# Patient Record
Sex: FEMALE | Race: WHITE | Employment: FULL TIME | URBAN - METROPOLITAN AREA
[De-identification: names, ages, dates, MRNs, and addresses within clinical notes are randomized per-mention and may not be internally consistent; named-entity substitution may affect disease eponyms.]

---

## 2018-09-02 ENCOUNTER — HOSPITAL ENCOUNTER (EMERGENCY)
Age: 58
Discharge: HOME | End: 2018-09-02
Payer: COMMERCIAL

## 2018-09-02 VITALS
SYSTOLIC BLOOD PRESSURE: 192 MMHG | TEMPERATURE: 97.6 F | WEIGHT: 160 LBS | DIASTOLIC BLOOD PRESSURE: 97 MMHG | RESPIRATION RATE: 16 BRPM | HEART RATE: 93 BPM | OXYGEN SATURATION: 100 %

## 2018-09-02 DIAGNOSIS — H60.502 ACUTE OTITIS EXTERNA OF LEFT EAR, UNSPECIFIED TYPE: ICD-10-CM

## 2018-09-02 DIAGNOSIS — H61.22 IMPACTED CERUMEN OF LEFT EAR: Primary | ICD-10-CM

## 2018-09-02 PROCEDURE — 6370000000 HC RX 637 (ALT 250 FOR IP): Performed by: NURSE PRACTITIONER

## 2018-09-02 PROCEDURE — 99212 OFFICE O/P EST SF 10 MIN: CPT

## 2018-09-02 PROCEDURE — 69210 REMOVE IMPACTED EAR WAX UNI: CPT

## 2018-09-02 RX ORDER — IBUPROFEN 400 MG/1
800 TABLET ORAL ONCE
Status: COMPLETED | OUTPATIENT
Start: 2018-09-02 | End: 2018-09-02

## 2018-09-02 RX ORDER — IBUPROFEN 800 MG/1
800 TABLET ORAL EVERY 8 HOURS PRN
Qty: 21 TABLET | Refills: 0 | Status: SHIPPED | OUTPATIENT
Start: 2018-09-02 | End: 2022-03-20

## 2018-09-02 RX ORDER — CIPROFLOXACIN AND DEXAMETHASONE 3; 1 MG/ML; MG/ML
4 SUSPENSION/ DROPS AURICULAR (OTIC) 2 TIMES DAILY
Qty: 1 BOTTLE | Refills: 0 | Status: SHIPPED | OUTPATIENT
Start: 2018-09-02 | End: 2018-09-09

## 2018-09-02 RX ADMIN — IBUPROFEN 800 MG: 400 TABLET ORAL at 09:54

## 2018-09-02 ASSESSMENT — PAIN SCALES - GENERAL
PAINLEVEL_OUTOF10: 7
PAINLEVEL_OUTOF10: 7

## 2018-09-02 ASSESSMENT — PAIN DESCRIPTION - DESCRIPTORS: DESCRIPTORS: ACHING

## 2018-09-02 ASSESSMENT — PAIN DESCRIPTION - ORIENTATION: ORIENTATION: LEFT

## 2018-09-02 ASSESSMENT — PAIN DESCRIPTION - LOCATION: LOCATION: EAR

## 2018-09-02 ASSESSMENT — PAIN DESCRIPTION - PAIN TYPE: TYPE: ACUTE PAIN

## 2018-09-02 NOTE — ED PROVIDER NOTES
Normal      ---------------------------------------------------PHYSICAL EXAM--------------------------------------      Constitutional/General: Alert and oriented x3, well appearing, non toxic in NAD  Head: Normocephalic and atraumatic  Eyes: conjunctiva clear  Ears: Right ear canal totally clear normal TM no signs of infection, left ear canal is occluded with cerumen  Mouth: Oropharynx clear, handling secretions, no trismus  Neck: Supple, full ROM,   Pulmonary: Lungs clear to auscultation bilaterally, no wheezes, rales, or rhonchi. Not in respiratory distress  Cardiovascular:  Regular rate and rhythm, no murmurs, gallops, or rubs. 2+ distal pulses  Abdomen: Soft, non tender, non distended,   Extremities: Moves all extremities x 4. Warm and well perfused  Skin: warm and dry without rash  Neurologic: GCS 15,  Psych: Normal Affect      ------------------------------ ED COURSE/MEDICAL DECISION MAKING----------------------  Medications   ibuprofen (ADVIL;MOTRIN) tablet 800 mg (not administered)         ED COURSE:       Medical Decision Making:    Ear irrigation was performed by the LPN with return of cerumen I did reexamine her ear. Ear canal is clear but she still has cerumen up against the TM. Ear canal appears erythematous. I did attempt to flush it again but was not able to remove any further of the cerumen. States her ear is a little bit painful I did give her some ibuprofen now. I did put her on some ciprodex otic drops. For the otitis externa I referred her to ENT for follow-up. She should also follow up with her doctor. Counseling: The emergency provider has spoken with the patient and discussed todays results, in addition to providing specific details for the plan of care and counseling regarding the diagnosis and prognosis.   Questions are answered at this time and they are agreeable with the plan.      --------------------------------- IMPRESSION AND DISPOSITION ---------------------------------    IMPRESSION  1. Impacted cerumen of left ear    2. Acute otitis externa of left ear, unspecified type        DISPOSITION  Disposition: Discharge to home  Patient condition is good      NOTE: This report was transcribed using voice recognition software.  Every effort was made to ensure accuracy; however, inadvertent computerized transcription errors may be present     BARB Snyder - CARLOS ENRIQUE  09/02/18 1940

## 2018-09-06 ENCOUNTER — OFFICE VISIT (OUTPATIENT)
Dept: ENT CLINIC | Age: 58
End: 2018-09-06
Payer: COMMERCIAL

## 2018-09-06 VITALS
SYSTOLIC BLOOD PRESSURE: 164 MMHG | HEIGHT: 63 IN | BODY MASS INDEX: 29.41 KG/M2 | HEART RATE: 90 BPM | WEIGHT: 166 LBS | DIASTOLIC BLOOD PRESSURE: 90 MMHG

## 2018-09-06 DIAGNOSIS — H61.22 LEFT EAR IMPACTED CERUMEN: Primary | ICD-10-CM

## 2018-09-06 DIAGNOSIS — H61.22 HEARING LOSS OF LEFT EAR DUE TO CERUMEN IMPACTION: ICD-10-CM

## 2018-09-06 DIAGNOSIS — H60.332 ACUTE SWIMMER'S EAR OF LEFT SIDE: ICD-10-CM

## 2018-09-06 PROCEDURE — 92504 EAR MICROSCOPY EXAMINATION: CPT | Performed by: PHYSICIAN ASSISTANT

## 2018-09-06 PROCEDURE — 99203 OFFICE O/P NEW LOW 30 MIN: CPT | Performed by: PHYSICIAN ASSISTANT

## 2018-09-06 ASSESSMENT — ENCOUNTER SYMPTOMS
RESPIRATORY NEGATIVE: 1
NAUSEA: 0
EYES NEGATIVE: 1
VOMITING: 0
GASTROINTESTINAL NEGATIVE: 1
COUGH: 0
SHORTNESS OF BREATH: 0

## 2018-09-06 NOTE — PROGRESS NOTES
[facility] Otolaryngology      Patient Name:  Nasima Rogers  :  1960     CHIEF C/O:    Chief Complaint   Patient presents with    Ear Problem     left ear plugged was seen at urgent care       HISTORY OBTAINED FROM:  patient    HISTORY OF PRESENT ILLNESS:       William Sanabria is a 60 y.o. female, here today for left ear plug. Patient states for the last couple months she started to have left ear hearing loss thought that she may have swimmers ear but tried to ignore it. Then on  patient went to urgent care who attempted to flush the patients ear small amounts of cerumen were removed. Patient states after that she had 2 days of left-sided ear pain that has since improved. Patient was given antibiotic drops ciprodex which she used once last night. Dizziness was resolved with exercises no further episodes recently. The patient did attempt to use Debrox to loosen wax prior to urgent care. None use recently. Patient notes complete left-sided hearing loss. Tinnitus: no  Family history of ENT disorders/ hearing: family history of hearing loss as patient has older age.      Past Medical History:   Diagnosis Date    Basal cell carcinoma of nose     Benign positional vertigo 2012    Fibromyalgia     Hypertension      Past Surgical History:   Procedure Laterality Date    SINUS SURGERY      repair hole from wisdom tooth removal    SKIN CANCER EXCISION      basal cell carcinoma/ rt side of nose       Current Outpatient Prescriptions:     ciprofloxacin-dexamethasone (CIPRODEX) 0.3-0.1 % otic suspension, Place 4 drops into the left ear 2 times daily for 7 days, Disp: 1 Bottle, Rfl: 0    ibuprofen (IBU) 800 MG tablet, Take 1 tablet by mouth every 8 hours as needed for Pain, Disp: 21 tablet, Rfl: 0  Penicillins and Amoxicillin-pot clavulanate  Social History   Substance Use Topics    Smoking status: Never Smoker    Smokeless tobacco: Never Used    Alcohol use No     Family History   Problem Relation Age removal was discontinued due to depth and consistency of cerumen and patient was advised to use ciprodex. Johanna present      DIAGNOSIS:    ICD-10-CM ICD-9-CM    1. Left ear impacted cerumen H61.22 380.4 [place] EAR MICROSCOPY EXAMINATION   2. Hearing loss of left ear due to cerumen impaction H61.22 389.8 [place] EAR MICROSCOPY EXAMINATION     380.4    3. Acute swimmer's ear of left side H60.332 380.12 [place] EAR MICROSCOPY EXAMINATION     IMPRESSION/PLAN:  use ciprodex 4 drops twice a day for 5 days to treat mild otitis externa that was present. Then allow the ear to completely dry for 1 day and start daily debrox soaks until your next visit. Follow up in 10 days for completion of cerumen removal of the left ear. The plan was explained and patient is without any further questions. Follow up in 10 day(s), or if any new or worsening symptoms call the office to be seen sooner or report to the emergency department. This note was done using voice recognition software. There may be accidental errors in grammar or spelling.    Charmayne Lemming, PA

## 2018-09-17 ENCOUNTER — OFFICE VISIT (OUTPATIENT)
Dept: ENT CLINIC | Age: 58
End: 2018-09-17
Payer: COMMERCIAL

## 2018-09-17 VITALS
HEIGHT: 62 IN | WEIGHT: 166.2 LBS | BODY MASS INDEX: 30.59 KG/M2 | HEART RATE: 95 BPM | SYSTOLIC BLOOD PRESSURE: 171 MMHG | DIASTOLIC BLOOD PRESSURE: 91 MMHG

## 2018-09-17 DIAGNOSIS — H61.21 HEARING LOSS OF RIGHT EAR DUE TO CERUMEN IMPACTION: Primary | ICD-10-CM

## 2018-09-17 PROCEDURE — 69210 REMOVE IMPACTED EAR WAX UNI: CPT | Performed by: OTOLARYNGOLOGY

## 2018-10-07 ASSESSMENT — ENCOUNTER SYMPTOMS
BLURRED VISION: 0
COUGH: 0
DOUBLE VISION: 0
HEARTBURN: 0
SHORTNESS OF BREATH: 0
VOMITING: 0

## 2019-06-19 ENCOUNTER — OFFICE VISIT (OUTPATIENT)
Dept: ENT CLINIC | Age: 59
End: 2019-06-19
Payer: COMMERCIAL

## 2019-06-19 VITALS
WEIGHT: 159 LBS | BODY MASS INDEX: 28.17 KG/M2 | DIASTOLIC BLOOD PRESSURE: 92 MMHG | SYSTOLIC BLOOD PRESSURE: 142 MMHG | HEART RATE: 84 BPM | HEIGHT: 63 IN

## 2019-06-19 DIAGNOSIS — H61.23 HEARING LOSS DUE TO CERUMEN IMPACTION, BILATERAL: Primary | ICD-10-CM

## 2019-06-19 PROCEDURE — 69210 REMOVE IMPACTED EAR WAX UNI: CPT | Performed by: OTOLARYNGOLOGY

## 2019-07-05 ASSESSMENT — ENCOUNTER SYMPTOMS
SHORTNESS OF BREATH: 0
VOMITING: 0
COUGH: 0
FACIAL SWELLING: 0

## 2019-07-11 ENCOUNTER — HOSPITAL ENCOUNTER (EMERGENCY)
Age: 59
Discharge: HOME | End: 2019-07-11
Payer: COMMERCIAL

## 2019-07-11 VITALS
TEMPERATURE: 98.2 F | BODY MASS INDEX: 28.8 KG/M2 | OXYGEN SATURATION: 99 % | RESPIRATION RATE: 16 BRPM | WEIGHT: 160 LBS | HEART RATE: 96 BPM | DIASTOLIC BLOOD PRESSURE: 100 MMHG | SYSTOLIC BLOOD PRESSURE: 198 MMHG

## 2019-07-11 DIAGNOSIS — L03.115 CELLULITIS OF RIGHT LOWER EXTREMITY: Primary | ICD-10-CM

## 2019-07-11 PROCEDURE — 99212 OFFICE O/P EST SF 10 MIN: CPT

## 2019-07-11 RX ORDER — DOXYCYCLINE HYCLATE 100 MG
100 TABLET ORAL 2 TIMES DAILY
Qty: 20 TABLET | Refills: 0 | Status: SHIPPED | OUTPATIENT
Start: 2019-07-11 | End: 2019-07-21

## 2022-03-20 ENCOUNTER — HOSPITAL ENCOUNTER (EMERGENCY)
Age: 62
Discharge: HOME | End: 2022-03-20
Payer: COMMERCIAL

## 2022-03-20 ENCOUNTER — APPOINTMENT (OUTPATIENT)
Dept: GENERAL RADIOLOGY | Age: 62
End: 2022-03-20
Payer: COMMERCIAL

## 2022-03-20 VITALS
SYSTOLIC BLOOD PRESSURE: 132 MMHG | WEIGHT: 170 LBS | DIASTOLIC BLOOD PRESSURE: 96 MMHG | RESPIRATION RATE: 18 BRPM | HEIGHT: 62 IN | OXYGEN SATURATION: 100 % | TEMPERATURE: 97 F | HEART RATE: 103 BPM | BODY MASS INDEX: 31.28 KG/M2

## 2022-03-20 DIAGNOSIS — M25.561 ACUTE PAIN OF RIGHT KNEE: ICD-10-CM

## 2022-03-20 DIAGNOSIS — S80.01XA CONTUSION OF RIGHT KNEE, INITIAL ENCOUNTER: ICD-10-CM
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
## 2022-03-20 DIAGNOSIS — I10 ESSENTIAL HYPERTENSION: ICD-10-CM

## 2022-03-20 DIAGNOSIS — M25.511 ACUTE PAIN OF RIGHT SHOULDER: ICD-10-CM

## 2022-03-20 DIAGNOSIS — M25.531 RIGHT WRIST PAIN: ICD-10-CM

## 2022-03-20 PROCEDURE — 73110 X-RAY EXAM OF WRIST: CPT

## 2022-03-20 PROCEDURE — 73060 X-RAY EXAM OF HUMERUS: CPT

## 2022-03-20 PROCEDURE — 6370000000 HC RX 637 (ALT 250 FOR IP): Performed by: PHYSICIAN ASSISTANT

## 2022-03-20 PROCEDURE — 73030 X-RAY EXAM OF SHOULDER: CPT

## 2022-03-20 PROCEDURE — 73562 X-RAY EXAM OF KNEE 3: CPT

## 2022-03-20 PROCEDURE — 99284 EMERGENCY DEPT VISIT MOD MDM: CPT

## 2022-03-20 RX ORDER — ACETAMINOPHEN 500 MG
1000 TABLET ORAL ONCE
Status: COMPLETED | OUTPATIENT
Start: 2022-03-20 | End: 2022-03-20

## 2022-03-20 RX ORDER — IBUPROFEN AND FAMOTIDINE 26.6; 8 MG/1; MG/1
1 TABLET, FILM COATED ORAL EVERY 8 HOURS PRN
Qty: 30 TABLET | Refills: 0 | Status: SHIPPED | OUTPATIENT
Start: 2022-03-20

## 2022-03-20 RX ADMIN — ACETAMINOPHEN 1000 MG: 500 TABLET ORAL at 01:15

## 2022-03-20 ASSESSMENT — PAIN DESCRIPTION - FREQUENCY: FREQUENCY: CONTINUOUS

## 2022-03-20 ASSESSMENT — PAIN DESCRIPTION - DESCRIPTORS: DESCRIPTORS: ACHING;SHARP;SHOOTING

## 2022-03-20 ASSESSMENT — PAIN SCALES - GENERAL
PAINLEVEL_OUTOF10: 10
PAINLEVEL_OUTOF10: 7

## 2022-03-20 ASSESSMENT — PAIN DESCRIPTION - LOCATION: LOCATION: ARM;SHOULDER;KNEE

## 2022-03-20 ASSESSMENT — PAIN - FUNCTIONAL ASSESSMENT: PAIN_FUNCTIONAL_ASSESSMENT: 0-10

## 2022-03-20 ASSESSMENT — PAIN DESCRIPTION - ORIENTATION: ORIENTATION: RIGHT

## 2022-03-20 NOTE — ED PROVIDER NOTES
[facility]     Department of Emergency Medicine   ED  Provider Note  Admit Date/RoomTime: 3/20/2022 12:15 AM  ED Room: 26/26    Chief Complaint   Arm Injury (right arm ( Patient fell at work )) and Knee Injury (right arm)    History of Present Illness      Magrarita Babcock is a 57 y.o. old female who presents to the ED for fall. 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 She states she was taking a patient to Xanax and water when some of the water came out of the cup and she slipped and fell. Patient states she did try to brace herself and is having pain to her right shoulder and right knee. She denies hitting her head or having any loss of consciousness. Patient does not take anticoagulation. She is denying any neck pain, headache, dizziness, vision changes, nausea, vomiting, chest pain, shortness breath, pain with breathing, abdominal pain, numbness/tingling, sensation changes, back pain, or difficulty with ambulation or balance. Patient states she does have pain to her knee with movement but she is still able to ambulate independently. Patient is alert and oriented x3 and in no apparent distress at this exam.  She is nontoxic-appearing. This is a workers comp case. ROS   Pertinent positives and negatives are stated within HPI, all other systems reviewed and are negative. Past Medical History:  has a past medical history of Basal cell carcinoma of nose, Benign positional vertigo, Fibromyalgia, and Hypertension. Past Surgical History:  has a past surgical history that includes sinus surgery (2003) and Skin cancer excision (2001). Social History:  reports that she has never smoked. She has never used smokeless tobacco. She reports that she does not drink alcohol and does not use drugs. Family History: family history includes High Blood Pressure in her father and mother. The patients home medications have been reviewed.     Allergies: Penicillins and Amoxicillin-pot clavulanate  Allergies have been reviewed with patient. Physical Exam   VS:  BP (!) 132/96   Pulse 103   Temp 97 °F (36.1 °C)   Resp 18   Ht 5' 2\" (1.575 m)   Wt 170 lb (77.1 kg)   SpO2 100%   BMI 31.09 kg/m²     Oxygen Saturation Interpretation: Normal.    * Patient reports history of HTN but is not on any medications  She has no cp, sob, headache, vision changes, or dizziness     Constitutional:  Alert and oriented x4, development consistent with age, NAD  HEENT:  NC/NT. No bruising or lacerations, No blood noted in nares or ears, EOMI, PERRLA, Airway patent. Neck:  No midline cervical tenderness. No crepitus, mild right trapezius tenderness     Chest:  Symmetrical without visible rash or tenderness. No bruising   Respiratory:  Clear and equal bilaterally with good airflow, No respiratory distress    CV:  Regular rate and rhythm at this exam, pulse 84  Back:  No midline or paravertebral tenderness. No step-offs. No costovertebral tenderness. Extremities: Tenderness to right AC and proximal humerus, no TTP to elbow, mild tenderness to wrist, full ROM all fingers with intact sensations distally, no tenderness to left UE, mild tenderness to right knee, no pain to feet, ankles, or hips   Integument:  Normal turgor. Warm, dry, without visible rash, unless noted elsewhere. Neurological:  GCS 15, CN II-XII intact, Motor functions intact. Lab / Imaging Results   (All laboratory and radiology results have been personally reviewed by myself)  Labs:  No results found for this visit on 03/20/22. Imaging: All Radiology results interpreted by Radiologist unless otherwise noted. XR SHOULDER RIGHT (MIN 2 VIEWS)   Final Result   No acute findings. XR HUMERUS RIGHT (MIN 2 VIEWS)   Final Result   No acute findings. XR WRIST RIGHT (MIN 3 VIEWS)   Final Result   No acute findings. XR KNEE RIGHT (3 VIEWS)   Final Result   No acute findings.            ED Course / Medical Decision Making Medications   acetaminophen (TYLENOL) tablet 1,000 mg (1,000 mg Oral Given 3/20/22 0115)     Re-examination:   Patients symptoms are improving. Consult(s):  None    Procedure(s):  none    MDM:       Counseling: The emergency provider has spoken with the patient/caregiver and discussed todays results, in addition to providing specific details for the plan of care and counseling regarding the diagnosis and prognosis. Questions are answered at this time and they are agreeable with the plan. All results reviewed with pt and all questions answered. Patient understands that she must follow-up with PCP for evaluation of hypertension and occupational health for follow-up on work injury. Patient was advised to return to ED if symptoms worsen or new symptoms develop. Pt remained nontoxic, afebrile, and A&O x4 during this ED visit. They agreed with plan of care, discharge, and importance of follow-up. Pt was in no distress at discharge. Vitals stable. They were educated on newly prescribed medications. She was able to ambulate out of department with no difficulty. Patient will be given work note to return to work on the 22nd. Assessment      1. Fall on same level from slipping, tripping or stumbling, initial encounter    2. Acute pain of right shoulder    3. Right wrist pain    4. Acute pain of right knee    5. Contusion of right knee, initial encounter    6. Essential hypertension      Plan   Discharge to home   Patient condition is good    New Medications     Discharge Medication List as of 3/20/2022  1:38 AM        Electronically signed by Karl Esquivel PA-C   DD: 3/20/22    **This report was transcribed using voice recognition software. Every effort was made to ensure accuracy; however, inadvertent computerized transcription errors may be present.     END OF ED PROVIDER NOTE         Karl Esquivel PA-C  03/20/22 [id number]

## 2022-03-20 NOTE — Clinical Note
Emilie Barnett was seen and treated in our emergency department on 3/20/2022. She may return to work on 03/21/2022. If you have any questions or concerns, please don't hesitate to call.       Frank Patiño PA-C

## 2022-04-11 ENCOUNTER — HOSPITAL ENCOUNTER (OUTPATIENT)
Dept: MRI IMAGING | Age: 62
Discharge: HOME | End: 2022-04-13
Payer: COMMERCIAL

## 2022-04-11 DIAGNOSIS — S46.811A TRAUMATIC TEAR OF SUPRASPINATUS TENDON OF RIGHT SHOULDER, INITIAL ENCOUNTER: ICD-10-CM

## 2022-04-11 PROCEDURE — 73221 MRI JOINT UPR EXTREM W/O DYE: CPT

## 2022-09-24 ENCOUNTER — HOSPITAL ENCOUNTER (EMERGENCY)
Age: 62
Discharge: HOME | End: 2022-09-24
Attending: EMERGENCY MEDICINE
Payer: COMMERCIAL

## 2022-09-24 ENCOUNTER — HOSPITAL ENCOUNTER (OUTPATIENT)
Dept: ULTRASOUND IMAGING | Age: 62
Discharge: HOME | End: 2022-09-24
Payer: COMMERCIAL

## 2022-09-24 ENCOUNTER — APPOINTMENT (OUTPATIENT)
Dept: GENERAL RADIOLOGY | Age: 62
End: 2022-09-24
Payer: COMMERCIAL

## 2022-09-24 VITALS
DIASTOLIC BLOOD PRESSURE: 90 MMHG | HEART RATE: 91 BPM | BODY MASS INDEX: 32.02 KG/M2 | OXYGEN SATURATION: 99 % | HEIGHT: 62 IN | SYSTOLIC BLOOD PRESSURE: 158 MMHG | TEMPERATURE: 98.1 F | WEIGHT: 174 LBS | RESPIRATION RATE: 18 BRPM

## 2022-09-24 DIAGNOSIS — R60.0 LEG EDEMA, RIGHT: ICD-10-CM

## 2022-09-24 DIAGNOSIS — S92.901A CLOSED FRACTURE OF RIGHT FOOT, INITIAL ENCOUNTER: Primary | ICD-10-CM

## 2022-09-24 DIAGNOSIS — R03.0 ELEVATED BLOOD PRESSURE READING: ICD-10-CM

## 2022-09-24 LAB
ALBUMIN SERPL-MCNC: 4.1 G/DL (ref 3.5–5.2)
ALP BLD-CCNC: 106 U/L (ref 35–104)
ALT SERPL-CCNC: 15 U/L (ref 0–32)
ANION GAP SERPL CALCULATED.3IONS-SCNC: 13 MMOL/L (ref 7–16)
AST SERPL-CCNC: 20 U/L (ref 0–31)
BASOPHILS ABSOLUTE: 0.07 E9/L (ref 0–0.2)
BASOPHILS RELATIVE PERCENT: 1.3 % (ref 0–2)
BILIRUB SERPL-MCNC: 0.4 MG/DL (ref 0–1.2)
BUN BLDV-MCNC: 18 MG/DL (ref 6–23)
BUN BLDV-MCNC: NORMAL MG/DL
CALCIUM SERPL-MCNC: 9 MG/DL (ref 8.6–10.2)
CALCIUM SERPL-MCNC: NORMAL MG/DL
CHLORIDE BLD-SCNC: 101 MMOL/L (ref 98–107)
CHLORIDE BLD-SCNC: NORMAL MMOL/L
CO2: 24 MMOL/L (ref 22–29)
CO2: NORMAL
CREAT SERPL-MCNC: 0.7 MG/DL (ref 0.5–1)
CREAT SERPL-MCNC: NORMAL MG/DL
EGFR: NORMAL
EOSINOPHILS ABSOLUTE: 0.14 E9/L (ref 0.05–0.5)
EOSINOPHILS RELATIVE PERCENT: 2.6 % (ref 0–6)
GFR AFRICAN AMERICAN: >60
GFR NON-AFRICAN AMERICAN: >60 ML/MIN/1.73
GLUCOSE BLD-MCNC: 104 MG/DL (ref 74–99)
GLUCOSE BLD-MCNC: NORMAL MG/DL
HCT VFR BLD CALC: 41.6 % (ref 34–48)
HEMOGLOBIN: 14 G/DL (ref 11.5–15.5)
IMMATURE GRANULOCYTES #: 0.02 E9/L
IMMATURE GRANULOCYTES %: 0.4 % (ref 0–5)
LYMPHOCYTES ABSOLUTE: 1.91 E9/L (ref 1.5–4)
LYMPHOCYTES RELATIVE PERCENT: 35.9 % (ref 20–42)
MCH RBC QN AUTO: 28.6 PG (ref 26–35)
MCHC RBC AUTO-ENTMCNC: 33.7 % (ref 32–34.5)
MCV RBC AUTO: 84.9 FL (ref 80–99.9)
MONOCYTES ABSOLUTE: 0.51 E9/L (ref 0.1–0.95)
MONOCYTES RELATIVE PERCENT: 9.6 % (ref 2–12)
NEUTROPHILS ABSOLUTE: 2.67 E9/L (ref 1.8–7.3)
NEUTROPHILS RELATIVE PERCENT: 50.2 % (ref 43–80)
PDW BLD-RTO: 12.7 FL (ref 11.5–15)
PLATELET # BLD: 246 E9/L (ref 130–450)
PMV BLD AUTO: 10.9 FL (ref 7–12)
POTASSIUM SERPL-SCNC: 4 MMOL/L (ref 3.5–5)
POTASSIUM SERPL-SCNC: NORMAL MMOL/L
PRO-BNP: 261 PG/ML (ref 0–125)
RBC # BLD: 4.9 E12/L (ref 3.5–5.5)
SODIUM BLD-SCNC: 138 MMOL/L (ref 132–146)
SODIUM BLD-SCNC: NORMAL MMOL/L
TOTAL PROTEIN: 7.2 G/DL (ref 6.4–8.3)
WBC # BLD: 5.3 E9/L (ref 4.5–11.5)

## 2022-09-24 PROCEDURE — 85025 COMPLETE CBC W/AUTO DIFF WBC: CPT

## 2022-09-24 PROCEDURE — 93971 EXTREMITY STUDY: CPT

## 2022-09-24 PROCEDURE — 73610 X-RAY EXAM OF ANKLE: CPT

## 2022-09-24 PROCEDURE — 73630 X-RAY EXAM OF FOOT: CPT

## 2022-09-24 PROCEDURE — 80053 COMPREHEN METABOLIC PANEL: CPT

## 2022-09-24 PROCEDURE — 36415 COLL VENOUS BLD VENIPUNCTURE: CPT

## 2022-09-24 PROCEDURE — 83880 ASSAY OF NATRIURETIC PEPTIDE: CPT

## 2022-09-24 PROCEDURE — 99284 EMERGENCY DEPT VISIT MOD MDM: CPT

## 2022-09-24 ASSESSMENT — PAIN SCALES - GENERAL
PAINLEVEL_OUTOF10: 6
PAINLEVEL_OUTOF10: 4
PAINLEVEL_OUTOF10: 6

## 2022-09-24 ASSESSMENT — PAIN - FUNCTIONAL ASSESSMENT: PAIN_FUNCTIONAL_ASSESSMENT: 0-10

## 2022-09-24 ASSESSMENT — PAIN DESCRIPTION - ORIENTATION: ORIENTATION: RIGHT

## 2022-09-24 ASSESSMENT — PAIN DESCRIPTION - LOCATION: LOCATION: ANKLE;FOOT

## 2022-09-24 NOTE — ED PROVIDER NOTES
HPI:  9/24/22, Time: 9:02 AM EDT         Linn Preston is a 60 y.o. female presenting to the ED for right ankle pain and swelling, beginning 1.5 months ago. 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 She thinks she may have twisted it at [facility] when she took her mother there. Her edema is going up her calf to her knee, improved if she wraps it or stays off of it. But the edema is increasing and it hurts to walk. The complaint has been persistent, moderate in severity, and worsened by nothing. Patient denies fever/chills, sore throat, cough, congestion, chest pain, shortness of breath, headache, visual disturbances, focal paresthesias, focal weakness, abdominal pain, nausea, vomiting, diarrhea, constipation, dysuria, hematuria, trauma, neck or back pain, rash or other complaints. She declines pain medication at this time. ROS:   A complete review of systems was performed and all pertinent positives and negatives are stated within HPI, all other systems reviewed and are negative.      --------------------------------------------- PAST HISTORY ---------------------------------------------  Past Medical History:  has a past medical history of Basal cell carcinoma of nose, Benign positional vertigo, Fibromyalgia, and Hypertension. Past Surgical History:  has a past surgical history that includes sinus surgery (2003) and Skin cancer excision (2001). Social History:  reports that she has never smoked. She has never used smokeless tobacco. She reports that she does not drink alcohol and does not use drugs. Family History: family history includes High Blood Pressure in her father and mother. The patients home medications have been reviewed.     Allergies: Penicillins and Amoxicillin-pot clavulanate        ----------------------------------------PHYSICAL EXAM--------------------------------------  Constitutional:  Well developed, well nourished, no acute distress, non-toxic appearance   Eyes:  PERRL, conjunctiva normal, EOMI  HENT:  Atraumatic, external ears normal, nose normal, oropharynx moist, no pharyngeal exudates. Neck- normal range of motion, no nuchal rigidity   Respiratory:  No respiratory distress, normal breath sounds, no rales, no wheezing   Cardiovascular:  Normal rate, normal rhythm, no murmurs, no gallops, no rubs. Radial and DP pulses 2+ bilaterally. Compartments are soft. She is warm and well perfused. Cap refill less than 3 seconds. GI:  Soft, nondistended, normal bowel sounds, nontender, no rebound, no guarding   :  No costovertebral angle tenderness   Musculoskeletal:  no edema LLE, 3+ pitting edema right lower extremity. no deformities. Right ankle with tenderness to palpation at the lateral malleolus and the dorsum of the foot. She has range of motion of the right foot and ankle and knee without difficulty or pain. Integument:  Well hydrated, no rash. Adequate perfusion. Neurologic:  Alert & oriented x 3, CN 2-12 normal, no focal deficits noted. DTRs 2+ bilateral patellar. Normal gait favoring right ankle. Psychiatric:  Speech and behavior appropriate       -------------------------------------------------- RESULTS -------------------------------------------------  I have personally reviewed all laboratory and imaging results for this patient. Results are listed below.      LABS:  Results for orders placed or performed during the hospital encounter of 09/24/22   CBC with Auto Differential   Result Value Ref Range    WBC 5.3 4.5 - 11.5 E9/L    RBC 4.90 3.50 - 5.50 E12/L    Hemoglobin 14.0 11.5 - 15.5 g/dL    Hematocrit 41.6 34.0 - 48.0 %    MCV 84.9 80.0 - 99.9 fL    MCH 28.6 26.0 - 35.0 pg    MCHC 33.7 32.0 - 34.5 %    RDW 12.7 11.5 - 15.0 fL    Platelets 662 263 - 557 E9/L    MPV 10.9 7.0 - 12.0 fL    Neutrophils % 50.2 43.0 - 80.0 %    Immature Granulocytes % 0.4 0.0 - 5.0 %    Lymphocytes % 35.9 20.0 - 42.0 %    Monocytes % 9.6 2.0 - 12.0 %    Eosinophils % 2.6 0.0 - 6.0 %    Basophils % 1.3 0.0 - 2.0 %    Neutrophils Absolute 2.67 1.80 - 7.30 E9/L    Immature Granulocytes # 0.02 E9/L    Lymphocytes Absolute 1.91 1.50 - 4.00 E9/L    Monocytes Absolute 0.51 0.10 - 0.95 E9/L    Eosinophils Absolute 0.14 0.05 - 0.50 E9/L    Basophils Absolute 0.07 0.00 - 0.20 E9/L   Comprehensive Metabolic Panel   Result Value Ref Range    Sodium 138 132 - 146 mmol/L    Potassium 4.0 3.5 - 5.0 mmol/L    Chloride 101 98 - 107 mmol/L    CO2 24 22 - 29 mmol/L    Anion Gap 13 7 - 16 mmol/L    Glucose 104 (H) 74 - 99 mg/dL    BUN 18 6 - 23 mg/dL    Creatinine 0.7 0.5 - 1.0 mg/dL    GFR Non-African American >60 >=60 mL/min/1.73    GFR African American >60     Calcium 9.0 8.6 - 10.2 mg/dL    Total Protein 7.2 6.4 - 8.3 g/dL    Albumin 4.1 3.5 - 5.2 g/dL    Total Bilirubin 0.4 0.0 - 1.2 mg/dL    Alkaline Phosphatase 106 (H) 35 - 104 U/L    ALT 15 0 - 32 U/L    AST 20 0 - 31 U/L   Brain Natriuretic Peptide   Result Value Ref Range    Pro- (H) 0 - 125 pg/mL       RADIOLOGY:  Interpreted by Radiologist.  XR ANKLE RIGHT (MIN 3 VIEWS)   Final Result   1. Cortical defect in the anterosuperior calcaneus. Differential includes   acute fracture and prior trauma. 2.  Cortical defect in the head of the 3rd metatarsal.  Differential includes   acute fracture and prior trauma. XR FOOT RIGHT (MIN 3 VIEWS)   Final Result   1. Cortical defect in the anterosuperior calcaneus. Differential includes   acute fracture and prior trauma. 2.  Cortical defect in the head of the 3rd metatarsal.  Differential includes   acute fracture and prior trauma. US DUP LOWER EXTREMITY RIGHT GUERITA    (Results Pending)           ------------------------- NURSING NOTES AND VITALS REVIEWED ---------------------------  The nursing notes within the ED encounter and vital signs as below have been reviewed by myself.     BP (!) 158/90 Pulse 91   Temp 98.1 °F (36.7 °C)   Resp 18   Ht 5' 2\" (1.575 m)   Wt 174 lb (78.9 kg)   SpO2 99%   BMI 31.83 kg/m²   Oxygen Saturation Interpretation: Normal      The patients available past medical records and past encounters were reviewed. ------------------------------ ED COURSE/MEDICAL DECISION MAKING----------------------  Medications - No data to display        Procedures:   none      Medical Decision Making:    Subtle fractures noted on Xray at head of 3rd metatarsal and anteriorsuperior calcaneous. She reports pain for 1 1/2 months now and this is likely the source of her pain. She would be unsteady on crutches so we will provide an orthopedic walking boot for her. She was advised that she does not have to sleep in it and should propped leg up. She declines pain medication, states that she has a sensitive stomach. She was referred to a podiatrist in [place]. driving restrictions reviewed. She was also advised that she should get an ultrasound of her lower extremity to evaluate for DVT as the swelling does extend up to her knee. She was written and ultrasound prescription today for the right lower extremity and advised that we will use a \"stat hold and call protocol\" where she may go to the hospital of her choice, in this case [address] [place] [address], and have the ultrasound done as an outpatient there today and the results will be called in. [facility] ultrasound tech was notified of patient coming. She did verbalize understanding of the process and procedure for her care and agrees to the plan. By the time my shift has ended today the patient has not shown up for her ultrasound. This patient's ED course included: re-evaluation prior to disposition and a personal history and physicial eaxmination    This patient has remained hemodynamically stable, unchanged, and been closely monitored during their ED course. Re-Evaluations:   Re-evaluation.   Patients symptoms show no change  Repeat physical examination is not changed      Consultations:   none    Critical Care: none    IClifford, DO, am the Primary Provider of Record    Counseling: The emergency provider has spoken with the patient and discussed todays results, in addition to providing specific details for the plan of care and counseling regarding the diagnosis and prognosis. Questions are answered at this time and they are agreeable with the plan.    --------------------------- IMPRESSION AND DISPOSITION ---------------------------------    IMPRESSION  1. Closed fracture of right foot, initial encounter    2.  Leg edema, right    3. Elevated blood pressure reading        DISPOSITION  Disposition: Discharge to home  Patient condition is stable              Tee Jamison DO  09/24/22 [id number]

## 2022-09-24 NOTE — DISCHARGE INSTRUCTIONS
Go to [facility] ultrasound dept now for ultrasound of right leg to evaluate for blood clot    Driving and Equipment Restrictions  Some medical problems make it dangerous to drive, ride a bike, or use machines. Some of these problems are:  A hard blow to the head (concussion). Passing out (fainting). Twitching and shaking (seizures). Low blood sugar. Taking medicine to help you relax (sedatives). Taking pain medicines. Wearing an eye patch. Wearing splints. This can make it hard to use parts of your body that you need to drive safely. HOME CARE   Do not drive until your doctor says it is okay. Do not use machines until your doctor says it is okay. You may need a form signed by your doctor (medical release) before you can drive again. You may also need this form before you do other tasks where you need to be fully alert. MAKE SURE YOU:  Understand these instructions. Will watch your condition. Will get help right away if you are not doing well or get worse.

## 2023-03-02 ENCOUNTER — HOSPITAL ENCOUNTER (EMERGENCY)
Age: 63
Discharge: HOME HEALTH | End: 2023-03-02
Payer: COMMERCIAL

## 2023-03-02 VITALS
TEMPERATURE: 98.6 F | HEART RATE: 87 BPM | WEIGHT: 170 LBS | OXYGEN SATURATION: 98 % | SYSTOLIC BLOOD PRESSURE: 189 MMHG | BODY MASS INDEX: 31.28 KG/M2 | DIASTOLIC BLOOD PRESSURE: 71 MMHG | HEIGHT: 62 IN | RESPIRATION RATE: 18 BRPM

## 2023-03-02 DIAGNOSIS — I10 HYPERTENSION, UNSPECIFIED TYPE: ICD-10-CM

## 2023-03-02 DIAGNOSIS — R00.2 PALPITATIONS: Primary | ICD-10-CM

## 2023-03-02 PROCEDURE — 99211 OFF/OP EST MAY X REQ PHY/QHP: CPT

## 2023-03-02 ASSESSMENT — PAIN - FUNCTIONAL ASSESSMENT: PAIN_FUNCTIONAL_ASSESSMENT: NONE - DENIES PAIN

## 2023-03-02 ASSESSMENT — PAIN SCALES - GENERAL: PAINLEVEL_OUTOF10: 0

## 2023-03-03 ENCOUNTER — APPOINTMENT (OUTPATIENT)
Dept: GENERAL RADIOLOGY | Age: 63
End: 2023-03-03
Payer: COMMERCIAL

## 2023-03-03 ENCOUNTER — HOSPITAL ENCOUNTER (EMERGENCY)
Age: 63
Discharge: HOME | End: 2023-03-03
Attending: EMERGENCY MEDICINE
Payer: COMMERCIAL

## 2023-03-03 VITALS
WEIGHT: 176 LBS | RESPIRATION RATE: 15 BRPM | HEART RATE: 105 BPM | OXYGEN SATURATION: 100 % | BODY MASS INDEX: 32.19 KG/M2 | DIASTOLIC BLOOD PRESSURE: 97 MMHG | TEMPERATURE: 98.1 F | SYSTOLIC BLOOD PRESSURE: 189 MMHG

## 2023-03-03 DIAGNOSIS — I10 ASYMPTOMATIC HYPERTENSION: Primary | ICD-10-CM

## 2023-03-03 DIAGNOSIS — F41.1 ANXIETY STATE: ICD-10-CM

## 2023-03-03 LAB
ANION GAP SERPL CALCULATED.3IONS-SCNC: 9 MMOL/L (ref 7–16)
BUN BLDV-MCNC: 9 MG/DL (ref 6–23)
CALCIUM SERPL-MCNC: 9 MG/DL (ref 8.6–10.2)
CHLORIDE BLD-SCNC: 103 MMOL/L (ref 98–107)
CO2: 26 MMOL/L (ref 22–29)
CREAT SERPL-MCNC: 0.6 MG/DL (ref 0.5–1)
GFR SERPL CREATININE-BSD FRML MDRD: >60 ML/MIN/1.73
GLUCOSE BLD-MCNC: 105 MG/DL (ref 74–99)
HCT VFR BLD CALC: 47 % (ref 34–48)
HEMOGLOBIN: 14.7 G/DL (ref 11.5–15.5)
MCH RBC QN AUTO: 28 PG (ref 26–35)
MCHC RBC AUTO-ENTMCNC: 31.3 % (ref 32–34.5)
MCV RBC AUTO: 89.5 FL (ref 80–99.9)
PDW BLD-RTO: 12.7 FL (ref 11.5–15)
PLATELET # BLD: 204 E9/L (ref 130–450)
PMV BLD AUTO: 12.5 FL (ref 7–12)
POTASSIUM SERPL-SCNC: 4.1 MMOL/L (ref 3.5–5)
RBC # BLD: 5.25 E12/L (ref 3.5–5.5)
SODIUM BLD-SCNC: 138 MMOL/L (ref 132–146)
TROPONIN, HIGH SENSITIVITY: <6 NG/L (ref 0–9)
WBC # BLD: 5.3 E9/L (ref 4.5–11.5)

## 2023-03-03 PROCEDURE — 84484 ASSAY OF TROPONIN QUANT: CPT

## 2023-03-03 PROCEDURE — 6360000002 HC RX W HCPCS: Performed by: EMERGENCY MEDICINE

## 2023-03-03 PROCEDURE — 96375 TX/PRO/DX INJ NEW DRUG ADDON: CPT

## 2023-03-03 PROCEDURE — 93005 ELECTROCARDIOGRAM TRACING: CPT | Performed by: EMERGENCY MEDICINE

## 2023-03-03 PROCEDURE — 71045 X-RAY EXAM CHEST 1 VIEW: CPT

## 2023-03-03 PROCEDURE — 99285 EMERGENCY DEPT VISIT HI MDM: CPT

## 2023-03-03 PROCEDURE — 85027 COMPLETE CBC AUTOMATED: CPT

## 2023-03-03 PROCEDURE — 80048 BASIC METABOLIC PNL TOTAL CA: CPT

## 2023-03-03 PROCEDURE — 96374 THER/PROPH/DIAG INJ IV PUSH: CPT

## 2023-03-03 RX ORDER — HYDRALAZINE HYDROCHLORIDE 20 MG/ML
20 INJECTION INTRAMUSCULAR; INTRAVENOUS ONCE
Status: COMPLETED | OUTPATIENT
Start: 2023-03-03 | End: 2023-03-03

## 2023-03-03 RX ORDER — LORAZEPAM 2 MG/ML
0.5 INJECTION INTRAMUSCULAR ONCE
Status: COMPLETED | OUTPATIENT
Start: 2023-03-03 | End: 2023-03-03

## 2023-03-03 RX ORDER — AMLODIPINE BESYLATE 5 MG/1
5 TABLET ORAL DAILY
Qty: 30 TABLET | Refills: 0 | Status: SHIPPED | OUTPATIENT
Start: 2023-03-03

## 2023-03-03 RX ORDER — METOPROLOL TARTRATE 50 MG/1
50 TABLET, FILM COATED ORAL 2 TIMES DAILY
Qty: 60 TABLET | Refills: 0 | Status: SHIPPED | OUTPATIENT
Start: 2023-03-03

## 2023-03-03 RX ADMIN — HYDRALAZINE HYDROCHLORIDE 20 MG: 20 INJECTION INTRAMUSCULAR; INTRAVENOUS at 17:49

## 2023-03-03 RX ADMIN — LORAZEPAM 0.5 MG: 2 INJECTION INTRAMUSCULAR; INTRAVENOUS at 19:37

## 2023-03-03 ASSESSMENT — LIFESTYLE VARIABLES: HOW MANY STANDARD DRINKS CONTAINING ALCOHOL DO YOU HAVE ON A TYPICAL DAY: PATIENT DOES NOT DRINK

## 2023-03-03 ASSESSMENT — ENCOUNTER SYMPTOMS
CHEST TIGHTNESS: 0
VOMITING: 0
SHORTNESS OF BREATH: 0
NAUSEA: 0

## 2023-03-03 ASSESSMENT — PAIN - FUNCTIONAL ASSESSMENT: PAIN_FUNCTIONAL_ASSESSMENT: 0-10

## 2023-03-03 NOTE — ED PROVIDER NOTES
[facility]  EMERGENCY DEPARTMENT ENCOUNTER        NAME: Nadeem Sandoval  :  1960  MRN:  [id number]  Date of evaluation: 3/2/2023  Provider: Gaby Marquez PA-C  PCP: No primary care provider on file. Note Started : 8:27 PM EST 3/2/23    Chief Complaint: Hypertension and Palpitations ( Started about a week ago )      This is a 74-year-old female that presents to urgent care complaining of having increasing elevated blood pressures over time. She does not have a family doctor and she is not on blood pressure medications. She states in the past she would have blood pressures around 140/80 140/70 but as time is gone on she has noticed that sometimes her blood pressure will be 200/110 and today she states that she has had similar blood pressures like that along with palpitations and the palpitations have been going on for sometimes as well. She knows that she feels like she has an irregular heartbeat and she comes in tonight to urgent care with a complaint also she states that she is under a lot of stress she takes care of her mother who is 80years old at home. 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 She denies any recent illness. No lightheadedness or dizziness. No shortness of breath that present. No numbness or tingling. On first contact of patient she appears to be in no acute distress. Review of Systems  Pertinent positives and negatives are stated within HPI, all other systems reviewed and are negative. Allergies: Penicillins and Amoxicillin-pot clavulanate     --------------------------------------------- PAST HISTORY ---------------------------------------------  Past Medical History:  has a past medical history of Basal cell carcinoma of nose, Benign positional vertigo, Fibromyalgia, and Hypertension. Past Surgical History:  has a past surgical history that includes sinus surgery () and Skin cancer excision ().     Social History: reports that she has never smoked. She has never used smokeless tobacco. She reports that she does not drink alcohol and does not use drugs. Family History: family history includes High Blood Pressure in her father and mother. The patients home medications have been reviewed. The nursing notes within the ED encounter have been reviewed. ------------------------------------------------SCREENINGS----------------------------------------------                        CIWA Assessment  BP: (!) 189/71  Heart Rate: 87           ---------------------------------------------PHYSICAL EXAM --------------------------------------------    Vitals:    03/02/23 2014   BP: (!) 189/71   Pulse: 87   Resp: 18   Temp: 98.6 °F (37 °C)   TempSrc: Infrared   SpO2: 98%   Weight: 170 lb (77.1 kg)   Height: 5' 2\" (1.575 m)     Oxygen Saturation Interpretation: Normal     Physical Exam  Vitals and nursing note reviewed. Constitutional:       Appearance: She is well-developed. HENT:      Head: Normocephalic and atraumatic. Eyes:      Pupils: Pupils are equal, round, and reactive to light. Cardiovascular:      Rate and Rhythm: Normal rate and regular rhythm. Heart sounds: Normal heart sounds. Pulmonary:      Effort: Pulmonary effort is normal.      Breath sounds: Normal breath sounds. Abdominal:      General: Bowel sounds are normal.      Palpations: Abdomen is soft. Musculoskeletal:      Cervical back: Normal range of motion and neck supple. Skin:     General: Skin is warm and dry. Neurological:      General: No focal deficit present. Mental Status: She is alert and oriented to person, place, and time. Cranial Nerves: Cranial nerves 2-12 are intact. Sensory: Sensation is intact. Motor: Motor function is intact. Coordination: Coordination is intact.       Gait: Gait is intact.       -------------------------------------------------- RESULTS -------------------------------------------------  No results found for this visit on 03/02/23. No orders to display       Labs Reviewed - No data to display    When ordered only abnormal lab results are displayed. All other labs were within normal range or not returned as of this dictation. Interpretation per the Radiologist below, if available at the time of this note:    No orders to display     No results found. No results found.     -------------------------------------------------PROCEDURES--------------------------------------------  Unless otherwise noted below, none      Procedures      ---EMERGENCY DEPARTMENT COURSE and DIFFERENTIAL DIAGNOSIS/MDM---  (CC/HPI Summary, DDx, ED Course, and Reassessment:) (Disposition Considerations (include 1 Tests not done, Admit vs D/C, Shared Decision Making, Pt Expectation of Test or Tx., Consults, Social Determinants, Chronic Conditiions, Records reviewed)    MDM  Number of Diagnoses or Management Options  Hypertension, unspecified type  Palpitations  Diagnosis management comments: Patient is in no acute distress. However she has been having elevated blood pressures I did recheck her blood pressures here and they were similar to the initial blood pressure. I did take a manual blood pressure it was 200/90. She is afebrile. O2 sat is normal.  Neurologically she is intact. However she states that she has been having these sensations of irregular heartbeats I told her that the urgent care setting here is not the place to have a cardiac work-up I recommended she go to the ER immediately for evaluation. She does not have a family doctor to follow-up with and I think the emergency department setting would be appropriate tonight for her to be looked at for this to give her reassurance and also they can work her up more doing cardiac testing for her.   However patient states that she has to take care of her mother and she does not want to wait in the ER and I offered to call the ambulance to have her go by EMS so she can get in quicker -  she refused this as well. She is going to have to sign a AMA form. I will give her the number for a [facility] referral line for a family doctor. Also she needed information about getting help for her mother and I told her to call [facility] and I gave her the phone number for that. Multiple times I did try to convince her at least drive to the ER tonight after leaving the urgent care but she did not want to do this. She was stable during her time here. The nurse also spoke to her about going to the walk-in clinic tomorrow to be rechecked         DISCHARGE MEDICATIONS:  Discharge Medication List as of 3/2/2023  9:00 PM          DISCONTINUED MEDICATIONS:  Discharge Medication List as of 3/2/2023  9:00 PM          [facility]  [facility]  [facility] [address] [phone]    go to emergency department immediately after discharge from urgent care    [place] [id number],[facility] Floor [id number] [phone]  Go to   immediately after discharge from urgent care    [facility] Physician Referral Line  [phone]        --------------------------------- ADDITIONAL PROVIDER NOTES ---------------------------------  I have spoken with the patient and discussed todays results, in addition to providing specific details for the plan of care and counseling regarding the diagnosis and prognosis. Their questions are answered at this time and they are agreeable with the plan. This patient is stable for discharge. I have shared the specific conditions for return, as well as the importance of follow-up. * NOTE: (Please note that portions of this note were completed with a voice recognition program.  Efforts were made to edit the dictations but occasionally words are mis-transcribed. )    --------------------------------- IMPRESSION AND DISPOSITION ---------------------------------    IMPRESSION  1. Palpitations    2. Hypertension, unspecified type        DISPOSITION [place] 03/02/2023 09:00:02 PM    Disposition: AMMANUEL (recommend she go to the ED after leaving )  Patient condition is stable    I am the Primary Clinician of Record.      Ta Almeida PA-C (electronically signed) on 3/2/2023 at 9:27 PM         Ta Almeida PA-C  03/02/23 2124

## 2023-03-03 NOTE — DISCHARGE INSTRUCTIONS
Go to ED after leaving urgent care      [facility] [phone]      [address], [id number] [place] [address] [place]

## 2023-03-03 NOTE — ED PROVIDER NOTES
Resents emergency department with concern for elevated blood pressure. She states that she took it at home using her mother's blood pressure cuff and it was elevated. Patient states that she has no symptoms. She denies any chest pain, shortness of breath, neurologic deficits or abdominal pain. She states that she had hypertension in the past but quit taking her medications when they ran out. 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 Patient states that she is tired and feels anxious. The history is provided by the patient. Review of Systems   Constitutional:  Positive for fatigue. Negative for activity change and diaphoresis. Respiratory:  Negative for chest tightness and shortness of breath. Cardiovascular:  Negative for chest pain and leg swelling. Gastrointestinal:  Negative for nausea and vomiting. Genitourinary:  Negative for flank pain. Musculoskeletal: Negative. Skin: Negative. Neurological:  Negative for syncope, weakness, light-headedness and headaches. Psychiatric/Behavioral:  The patient is nervous/anxious. Physical Exam  Constitutional:       General: She is not in acute distress. Appearance: Normal appearance. She is obese. She is not ill-appearing, toxic-appearing or diaphoretic. HENT:      Head: Normocephalic and atraumatic. Nose: Nose normal.   Eyes:      Extraocular Movements: Extraocular movements intact. Conjunctiva/sclera: Conjunctivae normal.      Pupils: Pupils are equal, round, and reactive to light. Cardiovascular:      Rate and Rhythm: Normal rate and regular rhythm. Pulses: Normal pulses. Heart sounds: Normal heart sounds. Pulmonary:      Effort: Pulmonary effort is normal.      Breath sounds: Normal breath sounds. Abdominal:      General: Abdomen is flat. Tenderness: There is no abdominal tenderness. Musculoskeletal:         General: No swelling or tenderness.  Normal range of motion. Cervical back: Normal range of motion. Right lower leg: No edema. Left lower leg: No edema. Skin:     General: Skin is warm. Capillary Refill: Capillary refill takes less than 2 seconds. Coloration: Skin is not pale. Neurological:      General: No focal deficit present. Mental Status: She is alert and oriented to person, place, and time. Mental status is at baseline. Sensory: No sensory deficit. Motor: No weakness. Gait: Gait normal.   Psychiatric:      Comments: Tearful   EKG Interpretation    Interpreted by emergency department physician    Rhythm: normal sinus   Rate: normal  Axis: normal  Ectopy: none  Conduction: normal  ST Segments: normal  T Waves: normal  Q Waves: nonspecific    Clinical Impression: no acute changes    Jessica Felix, DO      Procedures    Medical Decision Making  Amount and/or Complexity of Data Reviewed  Labs: ordered. Radiology: ordered. ECG/medicine tests: ordered. Risk  Prescription drug management. Presents emergency department with concern for elevated blood pressure. She states that she has been taking at home and has been quite elevated. She denies any associated symptoms with this. She has previous history of hypertension and has been noncompliant with her medications. She does not follow with a PCP. On physical exam there are no significant findings. Heart regular rate rhythm, lungs clear to auscultation, abdomen is soft and nontender. There is no focal neurologic deficits. Patient was provided hydralazine with improvement of her blood pressure by roughly 25%. Patient patient desires discharge at this time. I will restart her metoprolol and start her on Norvasc. I have advised her to follow-up with the PCP as an outpatient. She understands that she needs to return the emergency department if she develops any symptoms. Patient became tearful before being discharged.   She states that she has been anxious taking care of her mother and being the only caregiver. I discussed following up with the PCP regarding this as well. She states understanding.       --------------------------------------------- PAST HISTORY ---------------------------------------------  Past Medical History:  has a past medical history of Basal cell carcinoma of nose, Benign positional vertigo, Fibromyalgia, and Hypertension. Past Surgical History:  has a past surgical history that includes sinus surgery (2003) and Skin cancer excision (2001). Social History:  reports that she has never smoked. She has never used smokeless tobacco. She reports that she does not drink alcohol and does not use drugs. Family History: family history includes High Blood Pressure in her father and mother. The patients home medications have been reviewed.     Allergies: Penicillins and Amoxicillin-pot clavulanate    -------------------------------------------------- RESULTS -------------------------------------------------  Labs:  Results for orders placed or performed during the hospital encounter of 57/07/89   Basic metabolic panel   Result Value Ref Range    Sodium 138 132 - 146 mmol/L    Potassium 4.1 3.5 - 5.0 mmol/L    Chloride 103 98 - 107 mmol/L    CO2 26 22 - 29 mmol/L    Anion Gap 9 7 - 16 mmol/L    Glucose 105 (H) 74 - 99 mg/dL    BUN 9 6 - 23 mg/dL    Creatinine 0.6 0.5 - 1.0 mg/dL    Est, Glom Filt Rate >60 >=60 mL/min/1.73    Calcium 9.0 8.6 - 10.2 mg/dL   CBC   Result Value Ref Range    WBC 5.3 4.5 - 11.5 E9/L    RBC 5.25 3.50 - 5.50 E12/L    Hemoglobin 14.7 11.5 - 15.5 g/dL    Hematocrit 47.0 34.0 - 48.0 %    MCV 89.5 80.0 - 99.9 fL    MCH 28.0 26.0 - 35.0 pg    MCHC 31.3 (L) 32.0 - 34.5 %    RDW 12.7 11.5 - 15.0 fL    Platelets 434 307 - 362 E9/L    MPV 12.5 (H) 7.0 - 12.0 fL   Troponin   Result Value Ref Range    Troponin, High Sensitivity <6 0 - 9 ng/L   EKG 12 Lead   Result Value Ref Range    Ventricular Rate 81 BPM    Atrial Rate 81 BPM    P-R Interval 152 ms    QRS Duration 84 ms    Q-T Interval 408 ms    QTc Calculation (Bazett) 473 ms    P Axis 66 degrees    R Axis 32 degrees    T Axis 45 degrees       Radiology:  XR CHEST PORTABLE   Final Result   No acute process. Mild elevation of right hemidiaphragm. ------------------------- NURSING NOTES AND VITALS REVIEWED ---------------------------  Date / Time Roomed:  3/3/2023  4:39 PM  ED Bed Assignment:  05/05    The nursing notes within the ED encounter and vital signs as below have been reviewed. BP (!) 192/100   Pulse (!) 130   Temp 98.1 °F (36.7 °C)   Resp 21   Wt 176 lb (79.8 kg)   SpO2 100%   BMI 32.19 kg/m²   Oxygen Saturation Interpretation: Normal      ------------------------------------------ PROGRESS NOTES ------------------------------------------  I have spoken with the patient and discussed todays results, in addition to providing specific details for the plan of care and counseling regarding the diagnosis and prognosis. Their questions are answered at this time and they are agreeable with the plan. I discussed at length with them reasons for immediate return here for re evaluation. They will followup with primary care by calling their office tomorrow. --------------------------------- ADDITIONAL PROVIDER NOTES ---------------------------------  At this time the patient is without objective evidence of an acute process requiring hospitalization or inpatient management. They have remained hemodynamically stable throughout their entire ED visit and are stable for discharge with outpatient follow-up. The plan has been discussed in detail and they are aware of the specific conditions for emergent return, as well as the importance of follow-up. New Prescriptions    AMLODIPINE (NORVASC) 5 MG TABLET    Take 1 tablet by mouth daily    METOPROLOL TARTRATE (LOPRESSOR) 50 MG TABLET    Take 1 tablet by mouth 2 times daily       Diagnosis:  1. Asymptomatic hypertension    2. Anxiety state        Disposition:  Patient's disposition: Discharge to home  Patient's condition is stable.                     Lendel Gosselin, DO  03/03/23 1459

## 2023-03-04 LAB
EKG ATRIAL RATE: 81 BPM
EKG P AXIS: 66 DEGREES
EKG P-R INTERVAL: 152 MS
EKG Q-T INTERVAL: 408 MS
EKG QRS DURATION: 84 MS
EKG QTC CALCULATION (BAZETT): 473 MS
EKG R AXIS: 32 DEGREES
EKG T AXIS: 45 DEGREES
EKG VENTRICULAR RATE: 81 BPM

## 2023-03-04 PROCEDURE — 93010 ELECTROCARDIOGRAM REPORT: CPT | Performed by: INTERNAL MEDICINE

## 2023-03-19 ENCOUNTER — HOSPITAL ENCOUNTER (EMERGENCY)
Age: 63
Discharge: HOME | End: 2023-03-19
Payer: COMMERCIAL

## 2023-03-19 VITALS
RESPIRATION RATE: 16 BRPM | SYSTOLIC BLOOD PRESSURE: 181 MMHG | BODY MASS INDEX: 31.09 KG/M2 | HEART RATE: 90 BPM | TEMPERATURE: 97.7 F | WEIGHT: 170 LBS | DIASTOLIC BLOOD PRESSURE: 82 MMHG | OXYGEN SATURATION: 100 %

## 2023-03-19 DIAGNOSIS — H10.9 CONJUNCTIVITIS OF BOTH EYES, UNSPECIFIED CONJUNCTIVITIS TYPE: Primary | ICD-10-CM

## 2023-03-19 DIAGNOSIS — J01.90 ACUTE NON-RECURRENT SINUSITIS, UNSPECIFIED LOCATION: ICD-10-CM

## 2023-03-19 DIAGNOSIS — R05.9 COUGH, UNSPECIFIED TYPE: ICD-10-CM

## 2023-03-19 DIAGNOSIS — H00.014 HORDEOLUM EXTERNUM OF LEFT UPPER EYELID: ICD-10-CM

## 2023-03-19 PROCEDURE — 99211 OFF/OP EST MAY X REQ PHY/QHP: CPT

## 2023-03-19 RX ORDER — DOXYCYCLINE HYCLATE 100 MG
100 TABLET ORAL 2 TIMES DAILY
Qty: 14 TABLET | Refills: 0 | Status: SHIPPED | OUTPATIENT
Start: 2023-03-19 | End: 2023-03-26

## 2023-03-19 RX ORDER — GUAIFENESIN AND DEXTROMETHORPHAN HYDROBROMIDE 1200; 60 MG/1; MG/1
1 TABLET, EXTENDED RELEASE ORAL EVERY 12 HOURS PRN
Qty: 12 TABLET | Refills: 0 | Status: SHIPPED | OUTPATIENT
Start: 2023-03-19

## 2023-03-19 RX ORDER — OFLOXACIN 3 MG/ML
1 SOLUTION/ DROPS OPHTHALMIC 4 TIMES DAILY
Qty: 5 ML | Refills: 0 | Status: SHIPPED | OUTPATIENT
Start: 2023-03-19 | End: 2023-03-26

## 2023-03-19 ASSESSMENT — VISUAL ACUITY: OU: 1

## 2023-03-19 ASSESSMENT — PAIN - FUNCTIONAL ASSESSMENT: PAIN_FUNCTIONAL_ASSESSMENT: NONE - DENIES PAIN

## 2023-03-19 NOTE — Clinical Note
Leandro Bee was seen and treated in our emergency department on 3/19/2023. She may return to work on . If you have any questions or concerns, please don't hesitate to call.       Lisa Connell PA-C

## 2023-03-19 NOTE — Clinical Note
Lisandro Smith was seen and treated in our emergency department on 3/19/2023. She may return to work on 03/21/2023. If you have any questions or concerns, please don't hesitate to call.       Justin Jimenez PA-C

## 2023-03-20 NOTE — ED PROVIDER NOTES
and neck supple. Skin:     General: Skin is warm and dry. Neurological:      Mental Status: She is alert and oriented to person, place, and time. -------------------------------------------------- RESULTS -------------------------------------------------  No results found for this visit on 03/19/23. No orders to display       Labs Reviewed - No data to display    When ordered only abnormal lab results are displayed. All other labs were within normal range or not returned as of this dictation. Interpretation per the Radiologist below, if available at the time of this note:    No orders to display     No results found. No results found.     -------------------------------------------------PROCEDURES--------------------------------------------  Unless otherwise noted below, none      Procedures      ---EMERGENCY DEPARTMENT COURSE and DIFFERENTIAL DIAGNOSIS/MDM---  (CC/HPI Summary, DDx, ED Course, and Reassessment:) (Disposition Considerations (include 1 Tests not done, Admit vs D/C, Shared Decision Making, Pt Expectation of Test or Tx., Consults, Social Determinants, Chronic Conditiions, Records reviewed)    MDM  Number of Diagnoses or Management Options  Acute non-recurrent sinusitis, unspecified location  Conjunctivitis of both eyes, unspecified conjunctivitis type  Cough, unspecified type  Hordeolum externum of left upper eyelid  Diagnosis management comments: No acute distress. Patient placed on doxycycline and Mucinex DM for cough and sinus infection. For the conjunctivitis she was placed on Ocuflox. Instructions given.          DISCHARGE MEDICATIONS:  Discharge Medication List as of 3/19/2023  1:04 PM        START taking these medications    Details   ofloxacin (OCUFLOX) 0.3 % solution Place 1 drop into both eyes 4 times daily for 7 days, Disp-5 mL, R-0Normal      doxycycline hyclate (VIBRA-TABS) 100 MG tablet Take 1 tablet by mouth 2 times daily for 7 days, Disp-14 tablet, R-0Normal

## 2023-10-11 ENCOUNTER — OFFICE VISIT (OUTPATIENT)
Dept: FAMILY MEDICINE CLINIC | Age: 63
End: 2023-10-11
Payer: COMMERCIAL

## 2023-10-11 VITALS
WEIGHT: 174 LBS | RESPIRATION RATE: 20 BRPM | HEART RATE: 99 BPM | DIASTOLIC BLOOD PRESSURE: 100 MMHG | HEIGHT: 63 IN | SYSTOLIC BLOOD PRESSURE: 152 MMHG | TEMPERATURE: 97 F | OXYGEN SATURATION: 100 % | BODY MASS INDEX: 30.83 KG/M2

## 2023-10-11 DIAGNOSIS — K21.9 GASTROESOPHAGEAL REFLUX DISEASE WITHOUT ESOPHAGITIS: ICD-10-CM

## 2023-10-11 DIAGNOSIS — E55.9 VITAMIN D DEFICIENCY: ICD-10-CM

## 2023-10-11 DIAGNOSIS — Z13.220 SCREENING FOR CHOLESTEROL LEVEL: ICD-10-CM

## 2023-10-11 DIAGNOSIS — Z76.89 ESTABLISHING CARE WITH NEW DOCTOR, ENCOUNTER FOR: Primary | ICD-10-CM

## 2023-10-11 DIAGNOSIS — F41.9 ANXIETY: ICD-10-CM

## 2023-10-11 DIAGNOSIS — F32.9 REACTIVE DEPRESSION: ICD-10-CM

## 2023-10-11 DIAGNOSIS — Z81.8 FAMILY HISTORY OF DEMENTIA: ICD-10-CM

## 2023-10-11 DIAGNOSIS — G47.00 INSOMNIA, UNSPECIFIED TYPE: ICD-10-CM

## 2023-10-11 DIAGNOSIS — I10 HYPERTENSION, UNSPECIFIED TYPE: ICD-10-CM

## 2023-10-11 PROCEDURE — 99204 OFFICE O/P NEW MOD 45 MIN: CPT | Performed by: STUDENT IN AN ORGANIZED HEALTH CARE EDUCATION/TRAINING PROGRAM

## 2023-10-11 RX ORDER — METOPROLOL TARTRATE 50 MG/1
50 TABLET, FILM COATED ORAL 2 TIMES DAILY
Qty: 60 TABLET | Refills: 3 | Status: CANCELLED | OUTPATIENT
Start: 2023-10-11

## 2023-10-11 RX ORDER — AMLODIPINE BESYLATE 5 MG/1
5 TABLET ORAL DAILY
Qty: 90 TABLET | Refills: 3 | Status: CANCELLED | OUTPATIENT
Start: 2023-10-11

## 2023-10-11 RX ORDER — PANTOPRAZOLE SODIUM 20 MG/1
20 TABLET, DELAYED RELEASE ORAL
Qty: 90 TABLET | Refills: 1 | Status: SHIPPED | OUTPATIENT
Start: 2023-10-11

## 2023-10-11 RX ORDER — LISINOPRIL AND HYDROCHLOROTHIAZIDE 20; 12.5 MG/1; MG/1
1 TABLET ORAL DAILY
Qty: 30 TABLET | Refills: 1 | Status: SHIPPED | OUTPATIENT
Start: 2023-10-11

## 2023-10-11 RX ORDER — FLUOXETINE HYDROCHLORIDE 20 MG/1
20 CAPSULE ORAL DAILY
Qty: 30 CAPSULE | Refills: 3 | Status: SHIPPED | OUTPATIENT
Start: 2023-10-11

## 2023-10-11 ASSESSMENT — ENCOUNTER SYMPTOMS
ABDOMINAL PAIN: 0
SHORTNESS OF BREATH: 0
DIARRHEA: 0
COUGH: 0
BLOOD IN STOOL: 0
CONSTIPATION: 0
NAUSEA: 0
WHEEZING: 0

## 2023-10-11 NOTE — PATIENT INSTRUCTIONS
I do recommend magnesium 200-600 mg at your bedtime for muscle relaxation, restless legs, mind calming, reduces head aches, and a more restful night sleep. Oxide and citrate form help with constipation but can cause diarrhea, other forms of magnesium like gluconate and glycine are better is you don't have constipation.

## 2023-10-11 NOTE — PROGRESS NOTES
Jennifer Whitfield (:  1960) is a 58 y.o. female, New Patient established through [facility] , here for evaluation of the following:  New Patient         ASSESSMENT/PLAN      1. Establishing care with new doctor, encounter for  Patient here to establish care, she did recently use her mother, suffering from anxiety and depression, we will need to treat this before we can really address many of her health concerns, will also treat the very high blood pressures triple digits diastolic she notes at work, has not been on blood pressure medication since March  2. Insomnia, unspecified type  Chronic, not well controlled, she will trial magnesium  3. Reactive depression  Chronic, not well controlled, will provide her with list of counselors, will also start fluoxetine, did discuss side effects, no thoughts of hurting herself or others, some of this is likely secondary to her mom passing away as well as not sleeping well  Patient prescribed SSRI today, discussed side effects including initial nausea, vomiting, diarrhea, stomach pain, insomnia, and the increase possibility of suicidal ideations, discussed when to return to the clinic and/or go to the nearest emergency department, patient was understanding   -     FLUoxetine (PROZAC) 20 MG capsule; Take 1 capsule by mouth daily, Disp-30 capsule, R-3Normal  4. Anxiety  -     FLUoxetine (PROZAC) 20 MG capsule; Take 1 capsule by mouth daily, Disp-30 capsule, R-3Normal  5. Hypertension, unspecified type  Chronic, not well controlled, has not been on her blood pressure medications for many months, we will start with an ACE inhibitor and hydrochlorothiazide especially she is having some lower extremity swelling, will discontinue the beta-blocker and amlodipine for now, can reinstate these if blood pressure remains high 2-week blood pressure check nurse visit along with some labs  -     lisinopril-hydroCHLOROthiazide (PRINZIDE;ZESTORETIC) 20-12.5 MG per tablet;  Take 1 tablet by

## 2023-10-24 ENCOUNTER — APPOINTMENT (OUTPATIENT)
Dept: CT IMAGING | Age: 63
DRG: 305 | End: 2023-10-24
Payer: COMMERCIAL

## 2023-10-24 ENCOUNTER — APPOINTMENT (OUTPATIENT)
Dept: GENERAL RADIOLOGY | Age: 63
DRG: 305 | End: 2023-10-24
Payer: COMMERCIAL

## 2023-10-24 ENCOUNTER — HOSPITAL ENCOUNTER (INPATIENT)
Age: 63
LOS: 1 days | Discharge: HOME | DRG: 305 | End: 2023-10-26
Attending: EMERGENCY MEDICINE | Admitting: FAMILY MEDICINE
Payer: COMMERCIAL

## 2023-10-24 DIAGNOSIS — S50.02XA CONTUSION OF LEFT ELBOW, INITIAL ENCOUNTER: ICD-10-CM

## 2023-10-24 DIAGNOSIS — I10 HYPERTENSION, UNSPECIFIED TYPE: ICD-10-CM

## 2023-10-24 DIAGNOSIS — R91.8 LUNG NODULES: ICD-10-CM

## 2023-10-24 DIAGNOSIS — S09.90XA CLOSED HEAD INJURY, INITIAL ENCOUNTER: ICD-10-CM

## 2023-10-24 DIAGNOSIS — I16.0 HYPERTENSIVE URGENCY: Primary | ICD-10-CM

## 2023-10-24 DIAGNOSIS — S60.00XA FINGERTIP CONTUSION, INITIAL ENCOUNTER: ICD-10-CM

## 2023-10-24 DIAGNOSIS — S80.12XA CONTUSION OF LEFT LOWER EXTREMITY, INITIAL ENCOUNTER: ICD-10-CM

## 2023-10-24 DIAGNOSIS — M50.20 CERVICAL HERNIATED DISC: ICD-10-CM

## 2023-10-24 LAB
ALBUMIN SERPL-MCNC: 4.1 G/DL (ref 3.5–5.2)
ALP SERPL-CCNC: 108 U/L (ref 35–104)
ALT SERPL-CCNC: 22 U/L (ref 0–32)
ANION GAP SERPL CALCULATED.3IONS-SCNC: 12 MMOL/L (ref 7–16)
AST SERPL-CCNC: 27 U/L (ref 0–31)
BASOPHILS # BLD: 0.05 K/UL (ref 0–0.2)
BASOPHILS NFR BLD: 1 % (ref 0–2)
BILIRUB SERPL-MCNC: 0.3 MG/DL (ref 0–1.2)
BUN SERPL-MCNC: 17 MG/DL (ref 6–23)
CALCIUM SERPL-MCNC: 9.2 MG/DL (ref 8.6–10.2)
CHLORIDE SERPL-SCNC: 105 MMOL/L (ref 98–107)
CO2 SERPL-SCNC: 24 MMOL/L (ref 22–29)
CREAT SERPL-MCNC: 0.7 MG/DL (ref 0.5–1)
EOSINOPHIL # BLD: 0.07 K/UL (ref 0.05–0.5)
EOSINOPHILS RELATIVE PERCENT: 1 % (ref 0–6)
ERYTHROCYTE [DISTWIDTH] IN BLOOD BY AUTOMATED COUNT: 13.1 % (ref 11.5–15)
GFR SERPL CREATININE-BSD FRML MDRD: >60 ML/MIN/1.73M2
GLUCOSE SERPL-MCNC: 138 MG/DL (ref 74–99)
HCT VFR BLD AUTO: 43.5 % (ref 34–48)
HGB BLD-MCNC: 14.5 G/DL (ref 11.5–15.5)
IMM GRANULOCYTES # BLD AUTO: 0.03 K/UL (ref 0–0.58)
IMM GRANULOCYTES NFR BLD: 1 % (ref 0–5)
INR PPP: 1
LYMPHOCYTES NFR BLD: 1.28 K/UL (ref 1.5–4)
LYMPHOCYTES RELATIVE PERCENT: 20 % (ref 20–42)
MCH RBC QN AUTO: 28 PG (ref 26–35)
MCHC RBC AUTO-ENTMCNC: 33.3 G/DL (ref 32–34.5)
MCV RBC AUTO: 84.1 FL (ref 80–99.9)
MONOCYTES NFR BLD: 0.6 K/UL (ref 0.1–0.95)
MONOCYTES NFR BLD: 9 % (ref 2–12)
NEUTROPHILS NFR BLD: 69 % (ref 43–80)
NEUTS SEG NFR BLD: 4.51 K/UL (ref 1.8–7.3)
PARTIAL THROMBOPLASTIN TIME: 27.4 SEC (ref 24.5–35.1)
PLATELET # BLD AUTO: 222 K/UL (ref 130–450)
PMV BLD AUTO: 11.4 FL (ref 7–12)
POTASSIUM SERPL-SCNC: 3.7 MMOL/L (ref 3.5–5)
PROT SERPL-MCNC: 7 G/DL (ref 6.4–8.3)
PROTHROMBIN TIME: 11.4 SEC (ref 9.3–12.4)
RBC # BLD AUTO: 5.17 M/UL (ref 3.5–5.5)
SODIUM SERPL-SCNC: 141 MMOL/L (ref 132–146)
WBC OTHER # BLD: 6.5 K/UL (ref 4.5–11.5)

## 2023-10-24 PROCEDURE — 2580000003 HC RX 258: Performed by: EMERGENCY MEDICINE

## 2023-10-24 PROCEDURE — 96374 THER/PROPH/DIAG INJ IV PUSH: CPT

## 2023-10-24 PROCEDURE — 73502 X-RAY EXAM HIP UNI 2-3 VIEWS: CPT

## 2023-10-24 PROCEDURE — 96375 TX/PRO/DX INJ NEW DRUG ADDON: CPT

## 2023-10-24 PROCEDURE — 85730 THROMBOPLASTIN TIME PARTIAL: CPT

## 2023-10-24 PROCEDURE — 73560 X-RAY EXAM OF KNEE 1 OR 2: CPT

## 2023-10-24 PROCEDURE — 74177 CT ABD & PELVIS W/CONTRAST: CPT

## 2023-10-24 PROCEDURE — 85025 COMPLETE CBC W/AUTO DIFF WBC: CPT

## 2023-10-24 PROCEDURE — 70450 CT HEAD/BRAIN W/O DYE: CPT

## 2023-10-24 PROCEDURE — 6370000000 HC RX 637 (ALT 250 FOR IP): Performed by: EMERGENCY MEDICINE

## 2023-10-24 PROCEDURE — 73080 X-RAY EXAM OF ELBOW: CPT

## 2023-10-24 PROCEDURE — 96361 HYDRATE IV INFUSION ADD-ON: CPT

## 2023-10-24 PROCEDURE — 90714 TD VACC NO PRESV 7 YRS+ IM: CPT | Performed by: EMERGENCY MEDICINE

## 2023-10-24 PROCEDURE — 6360000004 HC RX CONTRAST MEDICATION: Performed by: STUDENT IN AN ORGANIZED HEALTH CARE EDUCATION/TRAINING PROGRAM

## 2023-10-24 PROCEDURE — 2W35XYZ IMMOBILIZATION OF BACK USING OTHER DEVICE: ICD-10-PCS | Performed by: EMERGENCY MEDICINE

## 2023-10-24 PROCEDURE — 85610 PROTHROMBIN TIME: CPT

## 2023-10-24 PROCEDURE — 6360000002 HC RX W HCPCS: Performed by: EMERGENCY MEDICINE

## 2023-10-24 PROCEDURE — 2500000003 HC RX 250 WO HCPCS: Performed by: EMERGENCY MEDICINE

## 2023-10-24 PROCEDURE — 72125 CT NECK SPINE W/O DYE: CPT

## 2023-10-24 PROCEDURE — 90471 IMMUNIZATION ADMIN: CPT | Performed by: EMERGENCY MEDICINE

## 2023-10-24 PROCEDURE — 73030 X-RAY EXAM OF SHOULDER: CPT

## 2023-10-24 PROCEDURE — 71260 CT THORAX DX C+: CPT

## 2023-10-24 PROCEDURE — 99285 EMERGENCY DEPT VISIT HI MDM: CPT

## 2023-10-24 PROCEDURE — 73110 X-RAY EXAM OF WRIST: CPT

## 2023-10-24 PROCEDURE — 80053 COMPREHEN METABOLIC PANEL: CPT

## 2023-10-24 RX ORDER — ACETAMINOPHEN 500 MG
1000 TABLET ORAL ONCE
Status: COMPLETED | OUTPATIENT
Start: 2023-10-24 | End: 2023-10-24

## 2023-10-24 RX ORDER — 0.9 % SODIUM CHLORIDE 0.9 %
1000 INTRAVENOUS SOLUTION INTRAVENOUS ONCE
Status: COMPLETED | OUTPATIENT
Start: 2023-10-24 | End: 2023-10-24

## 2023-10-24 RX ORDER — LABETALOL HYDROCHLORIDE 5 MG/ML
10 INJECTION, SOLUTION INTRAVENOUS ONCE
Status: COMPLETED | OUTPATIENT
Start: 2023-10-24 | End: 2023-10-24

## 2023-10-24 RX ORDER — TETANUS AND DIPHTHERIA TOXOIDS ADSORBED 2; 2 [LF]/.5ML; [LF]/.5ML
0.5 INJECTION INTRAMUSCULAR ONCE
Status: COMPLETED | OUTPATIENT
Start: 2023-10-24 | End: 2023-10-24

## 2023-10-24 RX ORDER — ENALAPRILAT 1.25 MG/ML
1.25 INJECTION INTRAVENOUS ONCE
Status: COMPLETED | OUTPATIENT
Start: 2023-10-24 | End: 2023-10-24

## 2023-10-24 RX ORDER — CIPROFLOXACIN HYDROCHLORIDE 3.5 MG/ML
SOLUTION/ DROPS TOPICAL
Status: DISCONTINUED
Start: 2023-10-24 | End: 2023-10-24 | Stop reason: WASHOUT

## 2023-10-24 RX ORDER — HYDRALAZINE HYDROCHLORIDE 20 MG/ML
10 INJECTION INTRAMUSCULAR; INTRAVENOUS ONCE
Status: COMPLETED | OUTPATIENT
Start: 2023-10-24 | End: 2023-10-24

## 2023-10-24 RX ADMIN — HYDRALAZINE HYDROCHLORIDE 10 MG: 20 INJECTION, SOLUTION INTRAMUSCULAR; INTRAVENOUS at 21:14

## 2023-10-24 RX ADMIN — SODIUM CHLORIDE 1000 ML: 9 INJECTION, SOLUTION INTRAVENOUS at 20:19

## 2023-10-24 RX ADMIN — IOPAMIDOL 75 ML: 755 INJECTION, SOLUTION INTRAVENOUS at 19:38

## 2023-10-24 RX ADMIN — LABETALOL HYDROCHLORIDE 10 MG: 5 INJECTION INTRAVENOUS at 20:25

## 2023-10-24 RX ADMIN — ACETAMINOPHEN 1000 MG: 500 TABLET ORAL at 20:22

## 2023-10-24 RX ADMIN — TETANUS AND DIPHTHERIA TOXOIDS ADSORBED 0.5 ML: 2; 2 INJECTION INTRAMUSCULAR at 20:33

## 2023-10-24 RX ADMIN — ENALAPRILAT 1.25 MG: 1.25 INJECTION INTRAVENOUS at 23:47

## 2023-10-24 ASSESSMENT — PAIN - FUNCTIONAL ASSESSMENT: PAIN_FUNCTIONAL_ASSESSMENT: 0-10

## 2023-10-24 ASSESSMENT — PATIENT HEALTH QUESTIONNAIRE - PHQ9
2. FEELING DOWN, DEPRESSED OR HOPELESS: 0
SUM OF ALL RESPONSES TO PHQ QUESTIONS 1-9: 0
SUM OF ALL RESPONSES TO PHQ QUESTIONS 1-9: 0
1. LITTLE INTEREST OR PLEASURE IN DOING THINGS: 0
SUM OF ALL RESPONSES TO PHQ QUESTIONS 1-9: 0
SUM OF ALL RESPONSES TO PHQ QUESTIONS 1-9: 0
SUM OF ALL RESPONSES TO PHQ9 QUESTIONS 1 & 2: 0

## 2023-10-24 ASSESSMENT — PAIN DESCRIPTION - DESCRIPTORS
DESCRIPTORS: ACHING;DISCOMFORT;DULL
DESCRIPTORS: ACHING

## 2023-10-24 ASSESSMENT — PAIN SCALES - GENERAL
PAINLEVEL_OUTOF10: 7
PAINLEVEL_OUTOF10: 4

## 2023-10-24 ASSESSMENT — PAIN DESCRIPTION - ORIENTATION
ORIENTATION: ANTERIOR
ORIENTATION: LEFT

## 2023-10-24 ASSESSMENT — PAIN DESCRIPTION - LOCATION: LOCATION: HEAD

## 2023-10-24 ASSESSMENT — LIFESTYLE VARIABLES: HOW OFTEN DO YOU HAVE A DRINK CONTAINING ALCOHOL: MONTHLY OR LESS

## 2023-10-24 NOTE — ED PROVIDER NOTES
[address] [place] [address], [facility] ENCOUNTER        Pt Name: Romario Lujan  MRN: [id number]  [address] [facility] 1960  Date of evaluation: 10/24/2023  Provider: Liudmila Phillips DO  PCP: Chuck Viveros MD  Note Started: 6:12 PM EDT 10/24/23    CHIEF COMPLAINT       Chief Complaint   Patient presents with    Headache     Patient fell backwards from 2nd step on a step ladder. Patient landed on occipital area of head on cement. Patient denies LOC. Not on thinners. Patient also c/o left side pain. Left leg/left elbow. Patient denies neck pain/mid back pain, only left side pain. HISTORY OF PRESENT ILLNESS: 1 or more Elements   History From: patient    Limitations to history : None    Romario Lujan is a 58 y.o. female who presents with headache, left chest wall pain, left side pain, back pain,  left elbow pain and abrasion, left lateral/posterior thigh abrasions/ecchymosis s/p mechanical fall off ladder today. Patient reports that she was going up the ladder and fell backwards off the third rung. She did hit her head and has a bump on her head but denies loss of consciousness. She did drive herself here. She declines pain medication aside from extra strength Tylenol at this time. She does have history of high blood pressure as well. She denies focal paresthesias, focal weakness, neck pain, nausea, vomiting, visual disturbances. The complaint has been persistent, moderate in severity, and worsened by nothing. Patient denies fever/chills, sore throat, cough, congestion, chest pressure, shortness of breath, edema, visual disturbances, focal paresthesias, focal weakness, abdominal pain, nausea, vomiting, diarrhea, constipation, dysuria, hematuria, neck  pain, rash or other complaints. C-collar ordered to be placed. Nursing Notes were all reviewed and agreed with or any disagreements were addressed in the HPI.     REVIEW OF SYSTEMS :           Positives and

## 2023-10-25 PROBLEM — I16.0 HYPERTENSIVE URGENCY: Status: ACTIVE | Noted: 2023-10-25

## 2023-10-25 LAB
ALBUMIN SERPL-MCNC: 4 G/DL (ref 3.5–5.2)
ALP SERPL-CCNC: 101 U/L (ref 35–104)
ALT SERPL-CCNC: 19 U/L (ref 0–32)
ANION GAP SERPL CALCULATED.3IONS-SCNC: 15 MMOL/L (ref 7–16)
AST SERPL-CCNC: 21 U/L (ref 0–31)
BASOPHILS # BLD: 0.06 K/UL (ref 0–0.2)
BASOPHILS NFR BLD: 1 % (ref 0–2)
BILIRUB SERPL-MCNC: 0.5 MG/DL (ref 0–1.2)
BUN SERPL-MCNC: 10 MG/DL (ref 6–23)
CALCIUM SERPL-MCNC: 8.2 MG/DL (ref 8.6–10.2)
CHLORIDE SERPL-SCNC: 107 MMOL/L (ref 98–107)
CO2 SERPL-SCNC: 20 MMOL/L (ref 22–29)
CREAT SERPL-MCNC: 0.6 MG/DL (ref 0.5–1)
EOSINOPHIL # BLD: 0.14 K/UL (ref 0.05–0.5)
EOSINOPHILS RELATIVE PERCENT: 2 % (ref 0–6)
ERYTHROCYTE [DISTWIDTH] IN BLOOD BY AUTOMATED COUNT: 13.2 % (ref 11.5–15)
GFR SERPL CREATININE-BSD FRML MDRD: >60 ML/MIN/1.73M2
GLUCOSE SERPL-MCNC: 94 MG/DL (ref 74–99)
HCT VFR BLD AUTO: 41.1 % (ref 34–48)
HGB BLD-MCNC: 13.6 G/DL (ref 11.5–15.5)
IMM GRANULOCYTES # BLD AUTO: <0.03 K/UL (ref 0–0.58)
IMM GRANULOCYTES NFR BLD: 0 % (ref 0–5)
LYMPHOCYTES NFR BLD: 2.86 K/UL (ref 1.5–4)
LYMPHOCYTES RELATIVE PERCENT: 35 % (ref 20–42)
MAGNESIUM SERPL-MCNC: 1.9 MG/DL (ref 1.6–2.6)
MCH RBC QN AUTO: 28.3 PG (ref 26–35)
MCHC RBC AUTO-ENTMCNC: 33.1 G/DL (ref 32–34.5)
MCV RBC AUTO: 85.6 FL (ref 80–99.9)
MONOCYTES NFR BLD: 0.84 K/UL (ref 0.1–0.95)
MONOCYTES NFR BLD: 10 % (ref 2–12)
NEUTROPHILS NFR BLD: 53 % (ref 43–80)
NEUTS SEG NFR BLD: 4.38 K/UL (ref 1.8–7.3)
PLATELET # BLD AUTO: 229 K/UL (ref 130–450)
PMV BLD AUTO: 11.9 FL (ref 7–12)
POTASSIUM SERPL-SCNC: 3.1 MMOL/L (ref 3.5–5)
PROT SERPL-MCNC: 6.5 G/DL (ref 6.4–8.3)
RBC # BLD AUTO: 4.8 M/UL (ref 3.5–5.5)
SODIUM SERPL-SCNC: 142 MMOL/L (ref 132–146)
WBC OTHER # BLD: 8.3 K/UL (ref 4.5–11.5)

## 2023-10-25 PROCEDURE — 85025 COMPLETE CBC W/AUTO DIFF WBC: CPT

## 2023-10-25 PROCEDURE — 36415 COLL VENOUS BLD VENIPUNCTURE: CPT

## 2023-10-25 PROCEDURE — 80053 COMPREHEN METABOLIC PANEL: CPT

## 2023-10-25 PROCEDURE — 2060000000 HC ICU INTERMEDIATE R&B

## 2023-10-25 PROCEDURE — 83735 ASSAY OF MAGNESIUM: CPT

## 2023-10-25 PROCEDURE — 6360000002 HC RX W HCPCS: Performed by: FAMILY MEDICINE

## 2023-10-25 PROCEDURE — 6370000000 HC RX 637 (ALT 250 FOR IP): Performed by: FAMILY MEDICINE

## 2023-10-25 PROCEDURE — 6360000002 HC RX W HCPCS: Performed by: INTERNAL MEDICINE

## 2023-10-25 PROCEDURE — 6360000002 HC RX W HCPCS: Performed by: EMERGENCY MEDICINE

## 2023-10-25 PROCEDURE — 6370000000 HC RX 637 (ALT 250 FOR IP): Performed by: INTERNAL MEDICINE

## 2023-10-25 PROCEDURE — 2580000003 HC RX 258: Performed by: FAMILY MEDICINE

## 2023-10-25 RX ORDER — ENOXAPARIN SODIUM 100 MG/ML
40 INJECTION SUBCUTANEOUS DAILY
Status: DISCONTINUED | OUTPATIENT
Start: 2023-10-25 | End: 2023-10-26 | Stop reason: HOSPADM

## 2023-10-25 RX ORDER — SODIUM CHLORIDE 9 MG/ML
INJECTION, SOLUTION INTRAVENOUS PRN
Status: DISCONTINUED | OUTPATIENT
Start: 2023-10-25 | End: 2023-10-26 | Stop reason: HOSPADM

## 2023-10-25 RX ORDER — KETOROLAC TROMETHAMINE 30 MG/ML
30 INJECTION, SOLUTION INTRAMUSCULAR; INTRAVENOUS ONCE
Status: COMPLETED | OUTPATIENT
Start: 2023-10-25 | End: 2023-10-25

## 2023-10-25 RX ORDER — MAGNESIUM SULFATE 1 G/100ML
1000 INJECTION INTRAVENOUS ONCE
Status: COMPLETED | OUTPATIENT
Start: 2023-10-25 | End: 2023-10-25

## 2023-10-25 RX ORDER — FLUOXETINE HYDROCHLORIDE 20 MG/1
20 CAPSULE ORAL DAILY
Status: DISCONTINUED | OUTPATIENT
Start: 2023-10-25 | End: 2023-10-26 | Stop reason: HOSPADM

## 2023-10-25 RX ORDER — SODIUM CHLORIDE 0.9 % (FLUSH) 0.9 %
10 SYRINGE (ML) INJECTION EVERY 12 HOURS SCHEDULED
Status: DISCONTINUED | OUTPATIENT
Start: 2023-10-25 | End: 2023-10-26 | Stop reason: HOSPADM

## 2023-10-25 RX ORDER — ACETAMINOPHEN 650 MG/1
650 SUPPOSITORY RECTAL EVERY 6 HOURS PRN
Status: DISCONTINUED | OUTPATIENT
Start: 2023-10-25 | End: 2023-10-26 | Stop reason: HOSPADM

## 2023-10-25 RX ORDER — LISINOPRIL AND HYDROCHLOROTHIAZIDE 20; 12.5 MG/1; MG/1
1 TABLET ORAL DAILY
Status: DISCONTINUED | OUTPATIENT
Start: 2023-10-25 | End: 2023-10-25 | Stop reason: SDUPTHER

## 2023-10-25 RX ORDER — POLYETHYLENE GLYCOL 3350 17 G/17G
17 POWDER, FOR SOLUTION ORAL DAILY PRN
Status: DISCONTINUED | OUTPATIENT
Start: 2023-10-25 | End: 2023-10-26 | Stop reason: HOSPADM

## 2023-10-25 RX ORDER — HYDRALAZINE HYDROCHLORIDE 20 MG/ML
10 INJECTION INTRAMUSCULAR; INTRAVENOUS EVERY 4 HOURS PRN
Status: DISCONTINUED | OUTPATIENT
Start: 2023-10-25 | End: 2023-10-26 | Stop reason: HOSPADM

## 2023-10-25 RX ORDER — SODIUM CHLORIDE 0.9 % (FLUSH) 0.9 %
10 SYRINGE (ML) INJECTION PRN
Status: DISCONTINUED | OUTPATIENT
Start: 2023-10-25 | End: 2023-10-26 | Stop reason: HOSPADM

## 2023-10-25 RX ORDER — PROMETHAZINE HYDROCHLORIDE 12.5 MG/1
12.5 TABLET ORAL EVERY 6 HOURS PRN
Status: DISCONTINUED | OUTPATIENT
Start: 2023-10-25 | End: 2023-10-26 | Stop reason: HOSPADM

## 2023-10-25 RX ORDER — HYDROCHLOROTHIAZIDE 25 MG/1
12.5 TABLET ORAL DAILY
Status: DISCONTINUED | OUTPATIENT
Start: 2023-10-25 | End: 2023-10-26 | Stop reason: HOSPADM

## 2023-10-25 RX ORDER — LISINOPRIL 10 MG/1
20 TABLET ORAL DAILY
Status: DISCONTINUED | OUTPATIENT
Start: 2023-10-25 | End: 2023-10-26 | Stop reason: HOSPADM

## 2023-10-25 RX ORDER — ACETAMINOPHEN 325 MG/1
650 TABLET ORAL EVERY 6 HOURS PRN
Status: DISCONTINUED | OUTPATIENT
Start: 2023-10-25 | End: 2023-10-26 | Stop reason: HOSPADM

## 2023-10-25 RX ORDER — PANTOPRAZOLE SODIUM 20 MG/1
20 TABLET, DELAYED RELEASE ORAL
Status: DISCONTINUED | OUTPATIENT
Start: 2023-10-25 | End: 2023-10-26 | Stop reason: HOSPADM

## 2023-10-25 RX ORDER — LABETALOL HYDROCHLORIDE 5 MG/ML
10 INJECTION, SOLUTION INTRAVENOUS EVERY 4 HOURS PRN
Status: DISCONTINUED | OUTPATIENT
Start: 2023-10-25 | End: 2023-10-26 | Stop reason: HOSPADM

## 2023-10-25 RX ORDER — POTASSIUM CHLORIDE 20 MEQ/1
40 TABLET, EXTENDED RELEASE ORAL 2 TIMES DAILY WITH MEALS
Status: COMPLETED | OUTPATIENT
Start: 2023-10-25 | End: 2023-10-25

## 2023-10-25 RX ORDER — ONDANSETRON 2 MG/ML
4 INJECTION INTRAMUSCULAR; INTRAVENOUS EVERY 6 HOURS PRN
Status: DISCONTINUED | OUTPATIENT
Start: 2023-10-25 | End: 2023-10-26 | Stop reason: HOSPADM

## 2023-10-25 RX ADMIN — HYDRALAZINE HYDROCHLORIDE 10 MG: 20 INJECTION INTRAMUSCULAR; INTRAVENOUS at 02:13

## 2023-10-25 RX ADMIN — HYDROCHLOROTHIAZIDE 12.5 MG: 25 TABLET ORAL at 08:45

## 2023-10-25 RX ADMIN — KETOROLAC TROMETHAMINE 30 MG: 30 INJECTION, SOLUTION INTRAMUSCULAR; INTRAVENOUS at 00:28

## 2023-10-25 RX ADMIN — FLUOXETINE 20 MG: 20 CAPSULE ORAL at 08:45

## 2023-10-25 RX ADMIN — ENOXAPARIN SODIUM 40 MG: 100 INJECTION SUBCUTANEOUS at 08:45

## 2023-10-25 RX ADMIN — LABETALOL HYDROCHLORIDE 10 MG: 5 INJECTION INTRAVENOUS at 19:59

## 2023-10-25 RX ADMIN — ACETAMINOPHEN 650 MG: 325 TABLET ORAL at 19:58

## 2023-10-25 RX ADMIN — POTASSIUM CHLORIDE 40 MEQ: 1500 TABLET, EXTENDED RELEASE ORAL at 11:37

## 2023-10-25 RX ADMIN — LISINOPRIL 20 MG: 10 TABLET ORAL at 08:45

## 2023-10-25 RX ADMIN — POTASSIUM CHLORIDE 40 MEQ: 1500 TABLET, EXTENDED RELEASE ORAL at 19:58

## 2023-10-25 RX ADMIN — HYDRALAZINE HYDROCHLORIDE 10 MG: 20 INJECTION INTRAMUSCULAR; INTRAVENOUS at 21:19

## 2023-10-25 RX ADMIN — MAGNESIUM SULFATE HEPTAHYDRATE 1000 MG: 1 INJECTION, SOLUTION INTRAVENOUS at 11:40

## 2023-10-25 RX ADMIN — ACETAMINOPHEN 650 MG: 325 TABLET ORAL at 04:41

## 2023-10-25 RX ADMIN — SODIUM CHLORIDE, PRESERVATIVE FREE 10 ML: 5 INJECTION INTRAVENOUS at 19:59

## 2023-10-25 RX ADMIN — SODIUM CHLORIDE, PRESERVATIVE FREE 10 ML: 5 INJECTION INTRAVENOUS at 02:14

## 2023-10-25 RX ADMIN — PANTOPRAZOLE SODIUM 20 MG: 20 TABLET, DELAYED RELEASE ORAL at 06:27

## 2023-10-25 ASSESSMENT — PAIN DESCRIPTION - ONSET: ONSET: GRADUAL

## 2023-10-25 ASSESSMENT — PAIN DESCRIPTION - ORIENTATION
ORIENTATION: LEFT
ORIENTATION: ANTERIOR

## 2023-10-25 ASSESSMENT — PAIN SCALES - GENERAL
PAINLEVEL_OUTOF10: 0
PAINLEVEL_OUTOF10: 5
PAINLEVEL_OUTOF10: 4
PAINLEVEL_OUTOF10: 2
PAINLEVEL_OUTOF10: 2
PAINLEVEL_OUTOF10: 0
PAINLEVEL_OUTOF10: 9
PAINLEVEL_OUTOF10: 3

## 2023-10-25 ASSESSMENT — PAIN - FUNCTIONAL ASSESSMENT
PAIN_FUNCTIONAL_ASSESSMENT: ACTIVITIES ARE NOT PREVENTED
PAIN_FUNCTIONAL_ASSESSMENT: ACTIVITIES ARE NOT PREVENTED

## 2023-10-25 ASSESSMENT — PAIN DESCRIPTION - FREQUENCY
FREQUENCY: CONTINUOUS
FREQUENCY: INTERMITTENT

## 2023-10-25 ASSESSMENT — PAIN DESCRIPTION - DESCRIPTORS
DESCRIPTORS: ACHING
DESCRIPTORS: DISCOMFORT
DESCRIPTORS: ACHING;DISCOMFORT;DULL

## 2023-10-25 ASSESSMENT — PAIN DESCRIPTION - LOCATION
LOCATION: HEAD;FLANK
LOCATION: HEAD

## 2023-10-25 ASSESSMENT — PAIN DESCRIPTION - PAIN TYPE
TYPE: ACUTE PAIN
TYPE: ACUTE PAIN

## 2023-10-25 NOTE — CARE COORDINATION
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 Pt drove  her self to [facility] and spouse will take her back to there car there on discharge. No dme of her own but there is some at the home from her mother who  in sept from dementia. No [facility] pta. Pt has a ranch home with 1 back door step to enter. The couple has 2 grown children with both out of the area. The pcp is dr. Meena Khan and last saw in sept. Pot is 3.1 . MENDY Gordon. 10/25/2023    Case Management Assessment  Initial Evaluation    Date/Time of Evaluation: 10/25/2023 9:38 AM  Assessment Completed by: MENDY Gordon    If patient is discharged prior to next notation, then this note serves as note for discharge by case management. Patient Name: Cheryl Cotton                   YOB: 1960  Diagnosis: Lung nodules [R91.8]  Cervical herniated disc [M50.20]  Hypertensive urgency [I16.0]  Contusion of left elbow, initial encounter [S50.02XA]  Fingertip contusion, initial encounter [S60.00XA]  Closed head injury, initial encounter [S09.90XA]  Contusion of left lower extremity, initial encounter [S80.12XA]                   Date / Time: 10/24/2023  5:22 PM    Patient Admission Status: Inpatient   Readmission Risk (Low < 19, Mod (19-27), High > 27): Readmission Risk Score: 5.1    Current PCP: Du Carrasco MD  PCP verified by ? Yes    Chart Reviewed: Yes      History Provided by: Patient  Patient Orientation: Alert and Oriented    Patient Cognition: Alert    Hospitalization in the last 30 days (Readmission):  No    If yes, Readmission Assessment in  Navigator will be completed.     Advance Directives:      Code Status: Full Code   Patient's Primary Decision Maker is: Named in Sindy Lockett    Primary Decision MakerMtiaradaryl Amezcua - Spouse - [phone]    Discharge Planning:    Patient lives with: Spouse/Significant Other Type of Home: House  Primary Care Giver: Self  Patient Support

## 2023-10-25 NOTE — PROGRESS NOTES
4 Eyes Skin Assessment     NAME:  Mireya Hernandez  YOB: 1960  MEDICAL RECORD NUMBER:  [id number]    The patient is being assessed for  Admission    I agree that at least one RN has performed a thorough Head to Toe Skin Assessment on the patient. ALL assessment sites listed below have been assessed. Left Elbow-Abrasion  Left Index Finger-Bruising  Left Posterior Thigh-Brusining/Abrasion     Areas assessed by both nurses:    Head, Face, Ears, Shoulders, Back, Chest, Arms, Elbows, Hands, Sacrum. Buttock, Coccyx, Ischium, Legs. Feet and Heels, and Under Medical Devices         Does the Patient have a Wound?  No noted wound(s)       Angel Prevention initiated by RN: No  Wound Care Orders initiated by RN: No    Pressure Injury (Stage 3,4, Unstageable, DTI, NWPT, and Complex wounds) if present, place Wound referral order by RN under : No    New Ostomies, if present place, Ostomy referral order under : No     Nurse 1 eSignature: Electronically signed by Doris Melgoza RN on 10/25/23 at 1:40 AM EDT    **SHARE this note so that the co-signing nurse can place an eSignature**    Nurse 2 eSignature: Electronically signed by Sasha Miranda RN on 10/25/23 at 1:45 AM EDT

## 2023-10-25 NOTE — H&P
or dislocation. No focal osseous lesion. No evidence of joint effusion. No focal soft tissue abnormality. No acute abnormality of the knee. Mild osteoarthritis. XR ELBOW LEFT (MIN 3 VIEWS)    Result Date: 10/24/2023  EXAMINATION: THREE XRAY VIEWS OF THE LEFT ELBOW 10/24/2023 7:07 pm COMPARISON: None. HISTORY: ORDERING SYSTEM PROVIDED HISTORY: pain s/p trauma TECHNOLOGIST PROVIDED HISTORY: Reason for exam:->pain s/p trauma FINDINGS: There is no elbow effusion. There is no acute fracture or dislocation. Alignment is normal.     No acute abnormality. XR SHOULDER LEFT (MIN 2 VIEWS)    Result Date: 10/24/2023  EXAMINATION: THREE XRAY VIEWS OF THE LEFT SHOULDER 10/24/2023 7:07 pm COMPARISON: None. HISTORY: ORDERING SYSTEM PROVIDED HISTORY: trauma TECHNOLOGIST PROVIDED HISTORY: Reason for exam:->trauma FINDINGS: Glenohumeral joint is normally aligned. No evidence of acute fracture or dislocation. No abnormal periarticular calcifications. The [facility] joint is unremarkable in appearance. Visualized lung is unremarkable. No acute abnormality. CT HEAD WO CONTRAST    Result Date: 10/24/2023  EXAMINATION: CT OF THE HEAD WITHOUT CONTRAST  10/24/2023 6:13 pm TECHNIQUE: CT of the head was performed without the administration of intravenous contrast. Automated exposure control, iterative reconstruction, and/or weight based adjustment of the mA/kV was utilized to reduce the radiation dose to as low as reasonably achievable. COMPARISON: None. HISTORY: ORDERING SYSTEM PROVIDED HISTORY: Evaluate intracranial abnormality TECHNOLOGIST PROVIDED HISTORY: Has a \"code stroke\" or \"stroke alert\" been called? ->No Reason for exam:->Evaluate intracranial abnormality Decision Support Exception - unselect if not a suspected or confirmed emergency medical condition->Emergency Medical Condition (MA) FINDINGS: The lateral and 3rd ventricles are mildly enlarged. The 4th ventricle is normal size and position.   Cortical sulci and

## 2023-10-25 NOTE — CARE COORDINATION
Advance Care Planning   Healthcare Decision Maker:    Primary Decision Maker: Cathy Nurse - Spouse - [phone]    Click here to complete Healthcare Decision Makers including selection of the Healthcare Decision Maker Relationship (ie \"Primary\").

## 2023-10-26 ENCOUNTER — APPOINTMENT (OUTPATIENT)
Dept: CT IMAGING | Age: 63
DRG: 305 | End: 2023-10-26
Payer: COMMERCIAL

## 2023-10-26 ENCOUNTER — TELEPHONE (OUTPATIENT)
Dept: FAMILY MEDICINE CLINIC | Age: 63
End: 2023-10-26

## 2023-10-26 VITALS
HEART RATE: 88 BPM | BODY MASS INDEX: 30.12 KG/M2 | TEMPERATURE: 97.8 F | DIASTOLIC BLOOD PRESSURE: 70 MMHG | WEIGHT: 170 LBS | SYSTOLIC BLOOD PRESSURE: 133 MMHG | RESPIRATION RATE: 18 BRPM | HEIGHT: 63 IN | OXYGEN SATURATION: 97 %
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
## 2023-10-26 PROBLEM — S09.90XA CHI (CLOSED HEAD INJURY): Status: ACTIVE | Noted: 2023-10-26

## 2023-10-26 PROBLEM — S06.0XAA CONCUSSION: Status: ACTIVE | Noted: 2023-10-26

## 2023-10-26 LAB
ANION GAP SERPL CALCULATED.3IONS-SCNC: 12 MMOL/L (ref 7–16)
BUN SERPL-MCNC: 12 MG/DL (ref 6–23)
CALCIUM SERPL-MCNC: 8.5 MG/DL (ref 8.6–10.2)
CHLORIDE SERPL-SCNC: 104 MMOL/L (ref 98–107)
CO2 SERPL-SCNC: 20 MMOL/L (ref 22–29)
CREAT SERPL-MCNC: 0.6 MG/DL (ref 0.5–1)
GFR SERPL CREATININE-BSD FRML MDRD: >60 ML/MIN/1.73M2
GLUCOSE SERPL-MCNC: 117 MG/DL (ref 74–99)
POTASSIUM SERPL-SCNC: 4.3 MMOL/L (ref 3.5–5)
SODIUM SERPL-SCNC: 136 MMOL/L (ref 132–146)

## 2023-10-26 PROCEDURE — 80048 BASIC METABOLIC PNL TOTAL CA: CPT

## 2023-10-26 PROCEDURE — 36415 COLL VENOUS BLD VENIPUNCTURE: CPT

## 2023-10-26 PROCEDURE — 6360000002 HC RX W HCPCS: Performed by: FAMILY MEDICINE

## 2023-10-26 PROCEDURE — 70450 CT HEAD/BRAIN W/O DYE: CPT

## 2023-10-26 PROCEDURE — 6370000000 HC RX 637 (ALT 250 FOR IP): Performed by: FAMILY MEDICINE

## 2023-10-26 RX ORDER — AMLODIPINE BESYLATE 5 MG/1
5 TABLET ORAL DAILY
Qty: 30 TABLET | Refills: 1 | Status: SHIPPED | OUTPATIENT
Start: 2023-10-26

## 2023-10-26 RX ORDER — LISINOPRIL AND HYDROCHLOROTHIAZIDE 20; 12.5 MG/1; MG/1
1 TABLET ORAL DAILY
Qty: 30 TABLET | Refills: 1 | Status: SHIPPED | OUTPATIENT
Start: 2023-10-26

## 2023-10-26 RX ORDER — AMLODIPINE BESYLATE 5 MG/1
5 TABLET ORAL DAILY
Status: DISCONTINUED | OUTPATIENT
Start: 2023-10-26 | End: 2023-10-26 | Stop reason: HOSPADM

## 2023-10-26 RX ORDER — BUTALBITAL, ACETAMINOPHEN AND CAFFEINE 50; 325; 40 MG/1; MG/1; MG/1
2 TABLET ORAL ONCE
Status: COMPLETED | OUTPATIENT
Start: 2023-10-26 | End: 2023-10-26

## 2023-10-26 RX ADMIN — FLUOXETINE 20 MG: 20 CAPSULE ORAL at 09:08

## 2023-10-26 RX ADMIN — BUTALBITAL, ACETAMINOPHEN, AND CAFFEINE 2 TABLET: 50; 325; 40 TABLET ORAL at 06:17

## 2023-10-26 RX ADMIN — ACETAMINOPHEN 650 MG: 325 TABLET ORAL at 10:48

## 2023-10-26 RX ADMIN — HYDROCHLOROTHIAZIDE 12.5 MG: 25 TABLET ORAL at 09:06

## 2023-10-26 RX ADMIN — LABETALOL HYDROCHLORIDE 10 MG: 5 INJECTION INTRAVENOUS at 10:16

## 2023-10-26 RX ADMIN — LISINOPRIL 20 MG: 10 TABLET ORAL at 09:06

## 2023-10-26 RX ADMIN — HYDRALAZINE HYDROCHLORIDE 10 MG: 20 INJECTION INTRAMUSCULAR; INTRAVENOUS at 04:34

## 2023-10-26 RX ADMIN — HYDRALAZINE HYDROCHLORIDE 10 MG: 20 INJECTION INTRAMUSCULAR; INTRAVENOUS at 09:06

## 2023-10-26 RX ADMIN — ACETAMINOPHEN 650 MG: 325 TABLET ORAL at 04:33

## 2023-10-26 ASSESSMENT — PAIN DESCRIPTION - LOCATION
LOCATION: HEAD

## 2023-10-26 ASSESSMENT — PAIN DESCRIPTION - ORIENTATION: ORIENTATION: ANTERIOR

## 2023-10-26 ASSESSMENT — PAIN DESCRIPTION - DESCRIPTORS
DESCRIPTORS: DISCOMFORT
DESCRIPTORS: TENDER;SORE
DESCRIPTORS: DISCOMFORT

## 2023-10-26 ASSESSMENT — PAIN SCALES - GENERAL
PAINLEVEL_OUTOF10: 6
PAINLEVEL_OUTOF10: 8
PAINLEVEL_OUTOF10: 6

## 2023-10-26 NOTE — PROGRESS NOTES
Pt discharged with full explanation of instructions. IV removed without complications. Heart monitor removed, cleaned and returned. All questions answered to the best of this RN ability.

## 2023-10-26 NOTE — DISCHARGE SUMMARY
tablet, Refills: 1           CONTINUE these medications which have CHANGED    Details   lisinopril-hydroCHLOROthiazide (PRINZIDE;ZESTORETIC) 20-12.5 MG per tablet Take 1 tablet by mouth daily  Qty: 30 tablet, Refills: 1    Associated Diagnoses: Hypertension, unspecified type           CONTINUE these medications which have NOT CHANGED    Details   FLUoxetine (PROZAC) 20 MG capsule Take 1 capsule by mouth daily  Qty: 30 capsule, Refills: 3    Associated Diagnoses: Reactive depression;  Anxiety      pantoprazole (PROTONIX) 20 MG tablet Take 1 tablet by mouth every morning (before breakfast)  Qty: 90 tablet, Refills: 1    Associated Diagnoses: Gastroesophageal reflux disease without esophagitis           Activity: activity as tolerated  Diet: cardiac diet    Follow-up with 1wk PCP    Note that 32 minutes was spent in preparing discharge papers, discussing discharge with patient, staff, consultants, medication review, arranging follow up, etc.    Signed:  Natividad Maria MD  10/26/2023  12:51 PM

## 2023-10-26 NOTE — PROGRESS NOTES
Head CT ordered - patient continues to have nausea and headache. Tylenol and labetalol given per MAR.

## 2023-10-26 NOTE — CARE COORDINATION
SOCIAL WORK/CASEMANAGEMENT TRANSITION OF CARE PLANNINGVinnyon Marianna MACKAY, [facility]): I met with pt this a.m. who said she had nausea and vomiting this a.m. her plan remains home with no needs. Bp elevated and pt is on iv labetalol and hydralazine.  MENDY Gordon.  10/26/2023

## 2023-10-26 NOTE — PROGRESS NOTES
"Pt presents to ED via EMS c/o abdominal pain x 2 weeks. Recently discharged from [facility] for similar complaints and dx with constipation. Pt states \"since they've changed my CPAP settings it's making my stomach pain worse.\" Pt has known history of AAA.   " Patient complaining of new onset nausea, PRN Zofran given. Patient also complaining of persistent 6/10 headache unrelieved by Tylenol. Notified Dr. Srikanth Lemus of the above. Fioricet ordered and given, will continue to monitor.

## 2023-10-26 NOTE — TELEPHONE ENCOUNTER
----- Message from Supa Bar sent at 10/26/2023 10:28 AM EDT -----  Subject: Message to Provider    QUESTIONS  Information for Provider? Patient wanted to let the doctor know she is   currently in the hosptial for Blood pressure issues and will not be at the   appointment tomorrow. ---------------------------------------------------------------------------  --------------  Annette Pino [facility]  [phone]; OK to leave message on voicemail  ---------------------------------------------------------------------------  --------------  SCRIPT ANSWERS  Relationship to Patient?  Self

## 2023-10-27 DIAGNOSIS — I10 HYPERTENSION, UNSPECIFIED TYPE: ICD-10-CM

## 2023-10-27 DIAGNOSIS — E55.9 VITAMIN D DEFICIENCY: ICD-10-CM

## 2023-10-27 DIAGNOSIS — Z13.220 SCREENING FOR CHOLESTEROL LEVEL: ICD-10-CM

## 2023-10-27 LAB
ABSOLUTE IMMATURE GRANULOCYTE: <0.03 K/UL (ref 0–0.58)
ALBUMIN SERPL-MCNC: 4.2 G/DL (ref 3.5–5.2)
ALP BLD-CCNC: 108 U/L (ref 35–104)
ALT SERPL-CCNC: 24 U/L (ref 0–32)
ANION GAP SERPL CALCULATED.3IONS-SCNC: 17 MMOL/L (ref 7–16)
AST SERPL-CCNC: 29 U/L (ref 0–31)
BASOPHILS ABSOLUTE: 0.04 K/UL (ref 0–0.2)
BASOPHILS RELATIVE PERCENT: 1 % (ref 0–2)
BILIRUB SERPL-MCNC: 0.5 MG/DL (ref 0–1.2)
BUN BLDV-MCNC: 11 MG/DL (ref 6–23)
CALCIUM SERPL-MCNC: 8.9 MG/DL (ref 8.6–10.2)
CHLORIDE BLD-SCNC: 102 MMOL/L (ref 98–107)
CHOLESTEROL: 245 MG/DL
CO2: 21 MMOL/L (ref 22–29)
CREAT SERPL-MCNC: 0.6 MG/DL (ref 0.5–1)
EOSINOPHILS ABSOLUTE: 0.1 K/UL (ref 0.05–0.5)
EOSINOPHILS RELATIVE PERCENT: 2 % (ref 0–6)
GFR SERPL CREATININE-BSD FRML MDRD: >60 ML/MIN/1.73M2
GLUCOSE BLD-MCNC: 99 MG/DL (ref 74–99)
HCT VFR BLD CALC: 45.7 % (ref 34–48)
HDLC SERPL-MCNC: 52 MG/DL
HEMOGLOBIN: 14.7 G/DL (ref 11.5–15.5)
IMMATURE GRANULOCYTES: 0 % (ref 0–5)
LDL CHOLESTEROL: 164 MG/DL
LYMPHOCYTES ABSOLUTE: 1.67 K/UL (ref 1.5–4)
LYMPHOCYTES RELATIVE PERCENT: 30 % (ref 20–42)
MCH RBC QN AUTO: 28.3 PG (ref 26–35)
MCHC RBC AUTO-ENTMCNC: 32.2 G/DL (ref 32–34.5)
MCV RBC AUTO: 88.1 FL (ref 80–99.9)
MONOCYTES ABSOLUTE: 0.53 K/UL (ref 0.1–0.95)
MONOCYTES RELATIVE PERCENT: 9 % (ref 2–12)
NEUTROPHILS ABSOLUTE: 3.3 K/UL (ref 1.8–7.3)
NEUTROPHILS RELATIVE PERCENT: 58 % (ref 43–80)
PDW BLD-RTO: 13.7 % (ref 11.5–15)
PLATELET # BLD: 253 K/UL (ref 130–450)
PMV BLD AUTO: 12.2 FL (ref 7–12)
POTASSIUM SERPL-SCNC: 4.3 MMOL/L (ref 3.5–5)
RBC # BLD: 5.19 M/UL (ref 3.5–5.5)
SODIUM BLD-SCNC: 140 MMOL/L (ref 132–146)
TOTAL PROTEIN: 7.3 G/DL (ref 6.4–8.3)
TRIGL SERPL-MCNC: 143 MG/DL
TSH SERPL DL<=0.05 MIU/L-ACNC: 1.58 UIU/ML (ref 0.27–4.2)
VITAMIN D 25-HYDROXY: 23.6 NG/ML (ref 30–100)
VLDLC SERPL CALC-MCNC: 29 MG/DL
WBC # BLD: 5.7 K/UL (ref 4.5–11.5)

## 2023-10-30 ENCOUNTER — OFFICE VISIT (OUTPATIENT)
Dept: FAMILY MEDICINE CLINIC | Age: 63
End: 2023-10-30
Payer: COMMERCIAL

## 2023-10-30 VITALS
WEIGHT: 173.6 LBS | HEART RATE: 116 BPM | DIASTOLIC BLOOD PRESSURE: 82 MMHG | SYSTOLIC BLOOD PRESSURE: 154 MMHG | HEIGHT: 63 IN | RESPIRATION RATE: 18 BRPM | BODY MASS INDEX: 30.76 KG/M2 | TEMPERATURE: 97.8 F | OXYGEN SATURATION: 98 %

## 2023-10-30 DIAGNOSIS — M54.6 ACUTE MIDLINE THORACIC BACK PAIN: Primary | ICD-10-CM

## 2023-10-30 LAB
EKG ATRIAL RATE: 120 BPM
EKG P AXIS: 75 DEGREES
EKG P-R INTERVAL: 146 MS
EKG Q-T INTERVAL: 336 MS
EKG QRS DURATION: 84 MS
EKG QTC CALCULATION (BAZETT): 474 MS
EKG R AXIS: 45 DEGREES
EKG T AXIS: 48 DEGREES
EKG VENTRICULAR RATE: 120 BPM

## 2023-10-30 PROCEDURE — 3079F DIAST BP 80-89 MM HG: CPT | Performed by: NURSE PRACTITIONER

## 2023-10-30 PROCEDURE — 99214 OFFICE O/P EST MOD 30 MIN: CPT | Performed by: NURSE PRACTITIONER

## 2023-10-30 PROCEDURE — 3077F SYST BP >= 140 MM HG: CPT | Performed by: NURSE PRACTITIONER

## 2023-10-30 PROCEDURE — 96372 THER/PROPH/DIAG INJ SC/IM: CPT | Performed by: NURSE PRACTITIONER

## 2023-10-30 RX ORDER — METHYLPREDNISOLONE 4 MG/1
TABLET ORAL
Qty: 1 KIT | Refills: 0 | Status: SHIPPED | OUTPATIENT
Start: 2023-10-30

## 2023-10-30 RX ORDER — TRIAMCINOLONE ACETONIDE 40 MG/ML
40 INJECTION, SUSPENSION INTRA-ARTICULAR; INTRAMUSCULAR ONCE
Status: COMPLETED | OUTPATIENT
Start: 2023-10-30 | End: 2023-10-30

## 2023-10-30 RX ORDER — TIZANIDINE HYDROCHLORIDE 2 MG/1
2 CAPSULE, GELATIN COATED ORAL EVERY 8 HOURS PRN
Qty: 15 CAPSULE | Refills: 0 | Status: SHIPPED | OUTPATIENT
Start: 2023-10-30

## 2023-10-30 RX ADMIN — TRIAMCINOLONE ACETONIDE 40 MG: 40 INJECTION, SUSPENSION INTRA-ARTICULAR; INTRAMUSCULAR at 12:43

## 2023-10-30 SDOH — ECONOMIC STABILITY: INCOME INSECURITY: HOW HARD IS IT FOR YOU TO PAY FOR THE VERY BASICS LIKE FOOD, HOUSING, MEDICAL CARE, AND HEATING?: NOT HARD AT ALL

## 2023-10-30 SDOH — ECONOMIC STABILITY: FOOD INSECURITY: WITHIN THE PAST 12 MONTHS, YOU WORRIED THAT YOUR FOOD WOULD RUN OUT BEFORE YOU GOT MONEY TO BUY MORE.: NEVER TRUE

## 2023-10-30 SDOH — ECONOMIC STABILITY: FOOD INSECURITY: WITHIN THE PAST 12 MONTHS, THE FOOD YOU BOUGHT JUST DIDN'T LAST AND YOU DIDN'T HAVE MONEY TO GET MORE.: NEVER TRUE

## 2023-10-30 SDOH — ECONOMIC STABILITY: HOUSING INSECURITY
IN THE LAST 12 MONTHS, WAS THERE A TIME WHEN YOU DID NOT HAVE A STEADY PLACE TO SLEEP OR SLEPT IN A SHELTER (INCLUDING NOW)?: NO

## 2023-10-30 NOTE — PROGRESS NOTES
10/30/23  Joseline Landa : 1960 Sex: female  Age 58 y.o. 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 HPI:   Joseline Landa , 58 y.o. female presents to the clinic for evaluation of back pain x 6 days. There patient also reports muscle spasms. The patient reports symptoms developed after falling off ladder. The patient was seen on 10/24/23 at ED for symptoms. Pt states the pain is worse with movement. The patient has taken Tylenol / Naproxen for symptoms. The patient reports unchanged symptoms over time. Pt denies any radiating pain, loss of sensation or strength of extremities, bowel/bladder incontinence, and hematuria. The patient also denies headache, fever, chest pain, abdominal pain, shortness of breath, and nausea / vomiting / diarrhea. ROS:   Unless otherwise stated in this report the patient's positive and negative responses for review of systems for constitutional, eyes, ENT, cardiovascular, respiratory, gastrointestinal, neurological, , musculoskeletal, and integument systems and related systems to the presenting problem are either stated in the history of present illness or were not pertinent or were negative for the symptoms and/or complaints related to the presenting medical problem. Positives and pertinent negatives as per HPI. All others reviewed and are negative.       PMH:     Past Medical History:   Diagnosis Date    Basal cell carcinoma of nose     Benign positional vertigo 2012    Fibromyalgia     Hypertension        Past Surgical History:   Procedure Laterality Date    SINUS SURGERY      repair hole from wisdom tooth removal    SKIN CANCER EXCISION      basal cell carcinoma/ rt side of nose       Family History

## 2023-10-31 ENCOUNTER — OFFICE VISIT (OUTPATIENT)
Dept: FAMILY MEDICINE CLINIC | Age: 63
End: 2023-10-31

## 2023-10-31 VITALS
SYSTOLIC BLOOD PRESSURE: 168 MMHG | BODY MASS INDEX: 30.83 KG/M2 | HEART RATE: 102 BPM | OXYGEN SATURATION: 98 % | RESPIRATION RATE: 20 BRPM | DIASTOLIC BLOOD PRESSURE: 81 MMHG | TEMPERATURE: 97 F | HEIGHT: 63 IN | WEIGHT: 174 LBS

## 2023-10-31 DIAGNOSIS — G31.9 BRAIN ATROPHY (HCC): ICD-10-CM

## 2023-10-31 DIAGNOSIS — I10 HYPERTENSION, UNSPECIFIED TYPE: ICD-10-CM

## 2023-10-31 DIAGNOSIS — Z53.20 BREAST SCREENING DECLINED: ICD-10-CM

## 2023-10-31 DIAGNOSIS — Z09 HOSPITAL DISCHARGE FOLLOW-UP: Primary | ICD-10-CM

## 2023-10-31 DIAGNOSIS — M41.34 THORACOGENIC SCOLIOSIS OF THORACIC REGION: ICD-10-CM

## 2023-10-31 DIAGNOSIS — R91.8 LUNG NODULES: ICD-10-CM

## 2023-10-31 DIAGNOSIS — E55.9 VITAMIN D DEFICIENCY: ICD-10-CM

## 2023-10-31 DIAGNOSIS — S06.0X0D CONCUSSION WITHOUT LOSS OF CONSCIOUSNESS, SUBSEQUENT ENCOUNTER: ICD-10-CM

## 2023-10-31 NOTE — PROGRESS NOTES
CT scan of the head, no memory problems  Hypertension, unspecified type  Vitamin D deficiency  Seen on labs from hospitalization, is currently taking a supplementation  Breast screening declined  Colonoscopy records will be obtained from [facility] gastroenterology   Medical Decision Making: moderate complexity  Return in 3 months (on 1/31/2024) for back pain . Subjective:   HPI:  Follow up of Hospital problems/diagnosis(es): Patient was seen in the emergency department 10/24 and admitted until 10/26/2023, she did fall off a ladder hit her head had concussion, also noted to have significant high hypertension, she also hurt her back at that time she was then seen in the emergency room again 10/26/2023 as she reexacerbated the back, imaging did show scoliosis with degenerative changes with recommendation for MRI if she did not improve    EKG did show possible left atrial enlargement Labs were normal for thyroid, CMP okay, CBC within normal limits, magnesium good, Vitamin D was low, LDL elevated at 164    Imaging did show herniation at the C6-C7, recommendation for nonemergent MRI, moderate facet hypertrophy other cervical levels  CT of the head did show small left posterior scalp hematoma, mild atrophy  X-ray hip was okay as well as elbow shoulder, wrist, knee with mild osteoarthritis on the left    Chest CT did show normal cardiac size, multiple pulmonary nodules suggestive of possible infectious or inflammatory etiology, right lower lobe 7 mm groundglass nodule, recommendation for CT of the chest in 6 months    CT of the abdomen normal    X-ray thoracic spine with mild degenerative changes and mild scoliosis which is unchanged, possible MRI    Inpatient course: Discharge summary reviewed- see chart. Interval history/Current status: Patient is here with multiple emergency room visits and one admission. see above. She did go to urgent care and doing better. She is currently wearing a brace.      She has neck

## 2023-11-22 ENCOUNTER — HOSPITAL ENCOUNTER (OUTPATIENT)
Dept: MRI IMAGING | Age: 63
Discharge: HOME | End: 2023-11-24
Payer: COMMERCIAL

## 2023-11-22 DIAGNOSIS — M41.34 THORACOGENIC SCOLIOSIS OF THORACIC REGION: ICD-10-CM

## 2023-11-22 PROCEDURE — 72146 MRI CHEST SPINE W/O DYE: CPT
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
## 2023-11-27 ENCOUNTER — EVALUATION (OUTPATIENT)
Dept: PHYSICAL THERAPY | Age: 63
End: 2023-11-27
Payer: COMMERCIAL

## 2023-11-27 DIAGNOSIS — M41.34 THORACOGENIC SCOLIOSIS OF THORACIC REGION: Primary | ICD-10-CM

## 2023-11-27 PROCEDURE — 97163 PT EVAL HIGH COMPLEX 45 MIN: CPT | Performed by: PHYSICAL THERAPIST

## 2023-11-27 NOTE — PROGRESS NOTES
Physical Therapy Treatment Note    Date: 2023  Patient Name: Ruthie Lira  : 1960   MRN: [id number]  [place]: 2 months exacerbation of chronic pain   DOSx: --  Referring Provider: Arianne Quezada MD  [address] [address] [facility],  [address] [place] [address]     Medical Diagnosis:      Diagnosis Orders   1. Thoracogenic scoliosis of thoracic region          Patient presents with back pain, neck pain that is sensitive to compression. Treatment will focus on reducing spinal compression via movement (stretching, AROM, pelvic tilts, posture) and then progressing into strengthening to build tissue resiliency. An active program is preferred to build tissue resiliency, however we may use heat, ice, electrical stimulation for symptom modification as appropriate. Outcome Measure:   Modified Oswestry 36% disability      Access Code: [email or web address]  URL: [email or web address]  Date: 2023  Prepared by: Josesito Winkler    Exercises  - Seated Forward Bending  - 5-10 x daily - 5-10 reps    X = TO BE PERFORMED NEXT VISIT  > = PROGRESS TO THIS    S: See eval  O:   Time 8882-5822     Visit   Auth pending Repeat outcome measure at mid point and end. Pain Pain 3-7/10     ROM      Modalities Use sparingly if at all. Prefer an active program.     MH / Ice + ES prn  MO         Manual         MT   ROM      Hooklying PPT   NR   SKTC ? NR   DKTC ? NR   Seated flexion X 10  NR         Exercise            ROWS: H X  TE   ROWS: M  Reach and Pull X  TE   ROWS: L   Reach and Pull X  TE   Marching X  TA   Squats X vs >  TE   [place] deadlift 2-leg >  TE                     A:  Tolerated well. Discussed anatomy, physiology, body mechanics, principles of loading, and progressive loading/activity. Feedback and cues necessary for developing neuromuscular control. Movement education and guided movement interventions such as verbal and tactile cues used to improve performance and control.   Reviewed home exercise program

## 2023-11-27 NOTE — PROGRESS NOTES
[facility] OUTPATIENT REHABILITATION  PHYSICAL THERAPY INITIAL EVALUATION         Date:  2023   Patient: Belinda Chicas  : 1960  MRN: [id number]  Referring Provider: Bonnie Soto MD  [address],  [address] [place] [address]     Medical Diagnosis:      Diagnosis Orders   1. Thoracogenic scoliosis of thoracic region            Physician Order: Eval and Treat      SUBJECTIVE:     History / Mechanism of Injury: Reports gradual onset of neck and mid back pain for many months to years. Her neck and back pain was exacerbated by a slip on a ladder in October and has continued. Notes in Epic report this slip on the ladder causing a concussion from head injury. Reports only concussion symptoms that remains is sleepiness. Disturbed Sleep: no  Bladder Dysfunction: no  Bowel Dysfunction: no    Interventions for current problem: oral steroids -- helped    Chief complaint: pain, decreased mobility, limited tolerance to weightbearing tasks and weightbearing duration (2 - 4 hours), limited ability to complete ADLs (dressing , bathing, grooming), limited ability to complete IADLs (cooking, cleaning, laundry), limited ability to lift/carry/handle material    Pain:   Current: 3/10     Best: 3/10     Worst:7/10    Symptom Type / Quality:  annoying  Location: axial      Provoking Activities/Positions: coughing, leaning forward, standing                 Relieving Activitie/Positions: ice, back brace, sitting    Imaging results: MRI THORACIC SPINE WO CONTRAST    Result Date: 2023  EXAMINATION: MRI OF THE THORACIC SPINE WITHOUT CONTRAST  2023 10:59 am TECHNIQUE: Multiplanar multisequence MRI of the thoracic spine was performed without the administration of intravenous contrast. COMPARISON: None.  HISTORY: ORDERING SYSTEM PROVIDED HISTORY: Thoracogenic scoliosis of thoracic region TECHNOLOGIST PROVIDED HISTORY: Reason for exam:->continued thoracic left pain with scoliosis

## 2023-12-07 ENCOUNTER — TREATMENT (OUTPATIENT)
Dept: PHYSICAL THERAPY | Age: 63
End: 2023-12-07
Payer: COMMERCIAL

## 2023-12-07 DIAGNOSIS — M41.34 THORACOGENIC SCOLIOSIS OF THORACIC REGION: Primary | ICD-10-CM

## 2023-12-07 PROCEDURE — 97110 THERAPEUTIC EXERCISES: CPT

## 2023-12-07 NOTE — PROGRESS NOTES
Physical Therapy Treatment Note    Date: 2023  Patient Name: Dhruv Galan  : 1960   MRN: [id number]  [place]: 2 months exacerbation of chronic pain   DOSx: --  Referring Provider: Avni Estevez MD  [address] [address] [facility],  [address] [place] [address]     Medical Diagnosis:      Diagnosis Orders   1. Thoracogenic scoliosis of thoracic region            Patient presents with back pain, neck pain that is sensitive to compression. Treatment will focus on reducing spinal compression via movement (stretching, AROM, pelvic tilts, posture) and then progressing into strengthening to build tissue resiliency. An active program is preferred to build tissue resiliency, however we may use heat, ice, electrical stimulation for symptom modification as appropriate. Outcome Measure:   Modified Oswestry 36% disability      Access Code: [email or web address]  URL: [email or web address]  Date: 2023  Prepared by: [place]    Exercises  - Seated Forward Bending  - 5-10 x daily - 5-10 reps    X = TO BE PERFORMED NEXT VISIT  > = PROGRESS TO THIS    S: Pt reports 5/10 pain today. States she just left work, wears an ice pack while there which helps with pain. O:   Time [id number]     Visit   Auth pending Repeat outcome measure at mid point and end. Pain Pain 3-7/10     ROM      Modalities Use sparingly if at all. Prefer an active program.     MH / Ice + ES prn  MO         Manual         MT   ROM      Hooklying PPT   NR   SKTC ? NR   DKTC ? NR   Seated flexion X 10  and x 5 between sets   NR         Exercise            ROWS: H Red 2 x 20  TE   ROWS: M  Reach and Pull Red 2 x 20  TE   ROWS: L   Reach and Pull Red 2 x 20  TE   Marching X 20 ea LE  TA   Squats X vs >  TE   [place] deadlift 2-leg >  TE                     A:  Tolerated well. Added rows and marching with seated flexion rests between sets as needed.  Discussed anatomy, physiology, body mechanics, principles of loading, and progressive

## 2023-12-12 ENCOUNTER — TREATMENT (OUTPATIENT)
Dept: PHYSICAL THERAPY | Age: 63
End: 2023-12-12
Payer: COMMERCIAL

## 2023-12-12 DIAGNOSIS — M41.34 THORACOGENIC SCOLIOSIS OF THORACIC REGION: Primary | ICD-10-CM

## 2023-12-12 PROCEDURE — 97110 THERAPEUTIC EXERCISES: CPT

## 2023-12-12 NOTE — PROGRESS NOTES
Physical Therapy Treatment Note    Date: 2023  Patient Name: Dirk Neely  : 1960   MRN: [id number]  [place]: 2 months exacerbation of chronic pain   DOSx: --    Referring Provider:   Nico Tidwell MD  [address] [address] [facility],  [address] [place] [address]         Medical Diagnosis:    Diagnosis Orders   1. Thoracogenic scoliosis of thoracic region            Patient presents with back pain, neck pain that is sensitive to compression. Treatment will focus on reducing spinal compression via movement (stretching, AROM, pelvic tilts, posture) and then progressing into strengthening to build tissue resiliency. An active program is preferred to build tissue resiliency, however we may use heat, ice, electrical stimulation for symptom modification as appropriate. Outcome Measure: Modified Oswestry 36% disability      Access Code: [email or web address]  URL: [email or web address]  Date: 2023  Prepared by: Brina Lombardo    Exercises  - Seated Forward Bending  - 5-10 x daily - 5-10 reps    X = TO BE PERFORMED NEXT VISIT  > = PROGRESS TO THIS    S: Pt reports she has been feeling better because she has been resting and hasn't worked since yesterday morning. O:   Time [id number]     Visit 3/6    Tracking Number: [id number]  appoved 6 visits  denied-   [id number], [id number], [id number]  2023 through 2024  ref # [id number]  copay- $50-after copay covered at 100%  Repeat outcome measure at mid point and end. Pain Pain 3-7/10     ROM      Modalities Use sparingly if at all. Prefer an active program.     MH / Ice + ES prn  MO         Manual         MT   ROM      Hooklying PPT   NR   SKTC ? NR   DKTC ? NR   Seated flexion X 10  and x 5 between sets   NR         Exercise            ROWS: H Red 2 x 20  TE   ROWS: M  Reach and Pull Red 2 x 20  TE   ROWS: L   Reach and Pull Red 2 x 20  TE   Marching 2 x 10   TA   Squats X vs >  TE   [place] deadlift 2-leg >  TE                     A: Tolerated well.  Discussed

## 2024-01-05 ENCOUNTER — TREATMENT (OUTPATIENT)
Dept: PHYSICAL THERAPY | Age: 64
End: 2024-01-05

## 2024-01-05 DIAGNOSIS — M41.34 THORACOGENIC SCOLIOSIS OF THORACIC REGION: Primary | ICD-10-CM

## 2024-01-05 NOTE — PROGRESS NOTES
Physical Therapy Treatment Note    Date: 2024  Patient Name: Delisa Diaz  : 1960   MRN: [id number]  DOInjury: 2 months exacerbation of chronic pain   DOSx: --    Referring Provider:   Viviane Beckett MD  [address] [place] [address]         Medical Diagnosis:    Diagnosis Orders   1. Thoracogenic scoliosis of thoracic region          Patient presents with back pain, neck pain that is sensitive to compression. Treatment will focus on reducing spinal compression via movement (stretching, AROM, pelvic tilts, posture) and then progressing into strengthening to build tissue resiliency.  An active program is preferred to build tissue resiliency, however we may use heat, ice, electrical stimulation for symptom modification as appropriate.       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 Pt reports 2/10 LBP normally but increases to 4-5/10 if does a lot of lifting, bending at work.  O:   Time 5302-6077     Visit   Tracking Number: [id number]  appoved 6 visits  denied-   [id number], [id number], [id number]  2023 through 2024  ref # [id number]  copay- $50-after copay covered at 100%  Repeat outcome measure at mid point and end.    Pain Pain 3-7/10     ROM      Modalities Use sparingly if at all.  Prefer an active program.     MH / Ice + ES prn  MO         Manual         MT   ROM      Hooklying PPT   NR   SKTC ?  NR   DKTC ?  NR   Seated flexion X 10  and x 5 between sets   NR         Exercise            ROWS: H Green 2 x 20  TE   ROWS: M  Reach and Pull Green 2 x 20  TE   ROWS: L   Reach and Pull Green 2 x 20  TE   Marching Bothers hip TA

## 2024-01-08 ENCOUNTER — TREATMENT (OUTPATIENT)
Dept: PHYSICAL THERAPY | Age: 64
End: 2024-01-08
Payer: COMMERCIAL

## 2024-01-08 DIAGNOSIS — M41.34 THORACOGENIC SCOLIOSIS OF THORACIC REGION: Primary | ICD-10-CM

## 2024-01-08 PROCEDURE — 97110 THERAPEUTIC EXERCISES: CPT

## 2024-01-08 NOTE — PROGRESS NOTES
Physical Therapy Treatment Note    Date: 2024  Patient Name: Delisa Diaz  : 1960   MRN: [id number]  DOInjury: 2 months exacerbation of chronic pain   DOSx: --    Referring Provider:   Viviane Beckett MD  [address] [place] [address]         Medical Diagnosis:    Diagnosis Orders   1. Thoracogenic scoliosis of thoracic region            Patient presents with back pain, neck pain that is sensitive to compression. Treatment will focus on reducing spinal compression via movement (stretching, AROM, pelvic tilts, posture) and then progressing into strengthening to build tissue resiliency.  An active program is preferred to build tissue resiliency, however we may use heat, ice, electrical stimulation for symptom modification as appropriate.       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 Pt reports 2/10 LBP normally but increases to 4-5/10 if does a lot of lifting, bending at work.  O:   Time 2143-9353     Visit   Tracking Number: [id number]  appoved 6 visits  denied-   [id number], [id number], [id number]  2023 through 2024  ref # [id number]  copay- $50-after copay covered at 100%  Repeat outcome measure at mid point and end.    Pain Pain 3-7/10     ROM      Modalities Use sparingly if at all.  Prefer an active program.     MH / Ice + ES prn  MO         Manual         MT   ROM      Hooklying PPT   NR   SKTC ?  NR   DKTC ?  NR   Seated flexion X 10  and x 5 between sets   NR         Exercise            ROWS: H Green 2 x 20  TE   ROWS: M  Reach and Pull Green 2 x 20  TE   ROWS: L   Reach and 
Disabled

## 2024-01-30 ENCOUNTER — OFFICE VISIT (OUTPATIENT)
Dept: FAMILY MEDICINE CLINIC | Age: 64
End: 2024-01-30
Payer: COMMERCIAL

## 2024-01-30 VITALS
HEART RATE: 78 BPM | BODY MASS INDEX: 29.41 KG/M2 | RESPIRATION RATE: 20 BRPM | HEIGHT: 63 IN | OXYGEN SATURATION: 98 % | TEMPERATURE: 96.6 F | SYSTOLIC BLOOD PRESSURE: 135 MMHG | DIASTOLIC BLOOD PRESSURE: 84 MMHG | WEIGHT: 166 LBS

## 2024-01-30 DIAGNOSIS — E55.9 VITAMIN D DEFICIENCY: ICD-10-CM

## 2024-01-30 DIAGNOSIS — R93.89 ABNORMAL CT OF THE CHEST: ICD-10-CM

## 2024-01-30 DIAGNOSIS — Z53.20 BREAST SCREENING DECLINED: ICD-10-CM

## 2024-01-30 DIAGNOSIS — R53.83 OTHER FATIGUE: ICD-10-CM

## 2024-01-30 DIAGNOSIS — Z53.20 COLONOSCOPY REFUSED: ICD-10-CM

## 2024-01-30 DIAGNOSIS — I10 HYPERTENSION, UNSPECIFIED TYPE: Primary | ICD-10-CM

## 2024-01-30 DIAGNOSIS — E78.00 PURE HYPERCHOLESTEROLEMIA: ICD-10-CM

## 2024-01-30 DIAGNOSIS — K21.9 GASTROESOPHAGEAL REFLUX DISEASE WITHOUT ESOPHAGITIS: ICD-10-CM

## 2024-01-30 DIAGNOSIS — R91.8 LUNG NODULES: ICD-10-CM

## 2024-01-30 DIAGNOSIS — F41.9 ANXIETY: ICD-10-CM

## 2024-01-30 DIAGNOSIS — F32.9 REACTIVE DEPRESSION: ICD-10-CM

## 2024-01-30 PROCEDURE — 3079F DIAST BP 80-89 MM HG: CPT | Performed by: STUDENT IN AN ORGANIZED HEALTH CARE EDUCATION/TRAINING PROGRAM

## 2024-01-30 PROCEDURE — 3075F SYST BP GE 130 - 139MM HG: CPT | Performed by: STUDENT IN AN ORGANIZED HEALTH CARE EDUCATION/TRAINING PROGRAM

## 2024-01-30 PROCEDURE — 99214 OFFICE O/P EST MOD 30 MIN: CPT | Performed by: STUDENT IN AN ORGANIZED HEALTH CARE EDUCATION/TRAINING PROGRAM

## 2024-01-30 RX ORDER — FLUOXETINE HYDROCHLORIDE 20 MG/1
20 CAPSULE ORAL DAILY
Qty: 90 CAPSULE | Refills: 3 | Status: SHIPPED | OUTPATIENT
Start: 2024-01-30

## 2024-01-30 RX ORDER — PANTOPRAZOLE SODIUM 20 MG/1
20 TABLET, DELAYED RELEASE ORAL
Qty: 90 TABLET | Refills: 3 | Status: SHIPPED | OUTPATIENT
Start: 2024-01-30

## 2024-01-30 ASSESSMENT — PATIENT HEALTH QUESTIONNAIRE - PHQ9
8. MOVING OR SPEAKING SO SLOWLY THAT OTHER PEOPLE COULD HAVE NOTICED. OR THE OPPOSITE, BEING SO FIGETY OR RESTLESS THAT YOU HAVE BEEN MOVING AROUND A LOT MORE THAN USUAL: 0
SUM OF ALL RESPONSES TO PHQ QUESTIONS 1-9: 0
10. IF YOU CHECKED OFF ANY PROBLEMS, HOW DIFFICULT HAVE THESE PROBLEMS MADE IT FOR YOU TO DO YOUR WORK, TAKE CARE OF THINGS AT HOME, OR GET ALONG WITH OTHER PEOPLE: 0
1. LITTLE INTEREST OR PLEASURE IN DOING THINGS: 0
4. FEELING TIRED OR HAVING LITTLE ENERGY: 0
5. POOR APPETITE OR OVEREATING: 0
3. TROUBLE FALLING OR STAYING ASLEEP: 0
7. TROUBLE CONCENTRATING ON THINGS, SUCH AS READING THE NEWSPAPER OR WATCHING TELEVISION: 0
9. THOUGHTS THAT YOU WOULD BE BETTER OFF DEAD, OR OF HURTING YOURSELF: 0
SUM OF ALL RESPONSES TO PHQ9 QUESTIONS 1 & 2: 0
2. FEELING DOWN, DEPRESSED OR HOPELESS: 0
6. FEELING BAD ABOUT YOURSELF - OR THAT YOU ARE A FAILURE OR HAVE LET YOURSELF OR YOUR FAMILY DOWN: 0
SUM OF ALL RESPONSES TO PHQ QUESTIONS 1-9: 0

## 2024-01-30 ASSESSMENT — ENCOUNTER SYMPTOMS
ABDOMINAL DISTENTION: 0
COUGH: 0
SHORTNESS OF BREATH: 0
WHEEZING: 0
ABDOMINAL PAIN: 0

## 2024-01-30 NOTE — PATIENT INSTRUCTIONS
Try Red Yeast Rice - Take Coenzyme Q10 if you have pain   Take Vitamin D  Get labs done before next visit   Get CT chest done before next visit - call Please call [phone] to schedule testing  - schedule after 4/23/2024

## 2024-01-30 NOTE — PROGRESS NOTES
Delisa Diaz (:  1960) is a 63 y.o. female, established patient follow up , here for evaluation of the following:  3 Month Follow-Up         ASSESSMENT/PLAN      1. Hypertension, unspecified type  Chronic, well controlled, continue current medications and treatment plan   2. Gastroesophageal reflux disease without esophagitis  Chronic, well controlled, continue current medications and treatment plan   -     pantoprazole (PROTONIX) 20 MG tablet; Take 1 tablet by mouth every morning (before breakfast), Disp-90 tablet, R-3Normal  3. Reactive depression  Chronic, well controlled, continue current medications and treatment plan   -     FLUoxetine (PROZAC) 20 MG capsule; Take 1 capsule by mouth daily, Disp-90 capsule, R-3Normal  4. Anxiety  -     FLUoxetine (PROZAC) 20 MG capsule; Take 1 capsule by mouth daily, Disp-90 capsule, R-3Normal  5. Other fatigue  6. Lung nodules  Due for follow-up on 7 mm lung nodule plus multiple subcentimeter pleural nodules, will order now, complete towards the end of April, will have her come back for a visit to review labs plus the CT imaging at that time  -     CT CHEST W CONTRAST; Future  -     Comprehensive Metabolic Panel; Future  7. Abnormal CT of the chest  -     CT CHEST W CONTRAST; Future  -     Comprehensive Metabolic Panel; Future  8. Pure hypercholesterolemia  -     Lipid Panel; Future  9. Vitamin D deficiency  -     Vitamin D 25 Hydroxy; Future  10. Colonoscopy refused  11. Breast screening declined    Return in about 3 months (around 2024) for flllow up labs and Ct chest .         Subjective   Portions of this note were completed by raphael Sotomayor during the course of the visit.  All decision-making was completed by, and the note in its entirety was reviewed by myself, Viviane Beckett MD.    SUBJECTIVE/OBJECTIVE:  HPI  Pt is here for follow up     BP has been ok home and at work   Usually 130s/85  In office 135/84  Pt feels it is high today due flat tire on way

## 2024-03-19 DIAGNOSIS — I10 HYPERTENSION, UNSPECIFIED TYPE: ICD-10-CM

## 2024-03-19 RX ORDER — AMLODIPINE BESYLATE 5 MG/1
5 TABLET ORAL DAILY
Qty: 30 TABLET | Refills: 1 | Status: SHIPPED | OUTPATIENT
Start: 2024-03-19

## 2024-03-19 RX ORDER — LISINOPRIL AND HYDROCHLOROTHIAZIDE 20; 12.5 MG/1; MG/1
1 TABLET ORAL DAILY
Qty: 30 TABLET | Refills: 1 | Status: SHIPPED | OUTPATIENT
Start: 2024-03-19

## 2024-03-26 ENCOUNTER — COMMUNITY OUTREACH (OUTPATIENT)
Dept: FAMILY MEDICINE CLINIC | Age: 64
End: 2024-03-26

## 2024-04-22 DIAGNOSIS — R91.8 LUNG NODULES: ICD-10-CM

## 2024-04-22 DIAGNOSIS — E55.9 VITAMIN D DEFICIENCY: ICD-10-CM

## 2024-04-22 DIAGNOSIS — E78.00 PURE HYPERCHOLESTEROLEMIA: ICD-10-CM

## 2024-04-22 DIAGNOSIS — R93.89 ABNORMAL CT OF THE CHEST: ICD-10-CM

## 2024-04-23 LAB
ALBUMIN: 4.4 G/DL (ref 3.5–5.2)
ALP BLD-CCNC: 102 U/L (ref 35–104)
ALT SERPL-CCNC: 27 U/L (ref 0–32)
ANION GAP SERPL CALCULATED.3IONS-SCNC: 18 MMOL/L (ref 7–16)
AST SERPL-CCNC: 30 U/L (ref 0–31)
BILIRUB SERPL-MCNC: 0.5 MG/DL (ref 0–1.2)
BUN BLDV-MCNC: 11 MG/DL (ref 6–23)
CALCIUM SERPL-MCNC: 9.2 MG/DL (ref 8.6–10.2)
CHLORIDE BLD-SCNC: 101 MMOL/L (ref 98–107)
CHOLESTEROL: 219 MG/DL
CO2: 21 MMOL/L (ref 22–29)
CREAT SERPL-MCNC: 0.7 MG/DL (ref 0.5–1)
GFR SERPL CREATININE-BSD FRML MDRD: >90 ML/MIN/1.73M2
GLUCOSE BLD-MCNC: 80 MG/DL (ref 74–99)
HDLC SERPL-MCNC: 44 MG/DL
LDL CHOLESTEROL: 143 MG/DL
POTASSIUM SERPL-SCNC: 3.8 MMOL/L (ref 3.5–5)
SODIUM BLD-SCNC: 140 MMOL/L (ref 132–146)
TOTAL PROTEIN: 7.5 G/DL (ref 6.4–8.3)
TRIGL SERPL-MCNC: 159 MG/DL
VITAMIN D 25-HYDROXY: 28 NG/ML (ref 30–100)
VLDLC SERPL CALC-MCNC: 32 MG/DL

## 2024-04-30 ENCOUNTER — OFFICE VISIT (OUTPATIENT)
Dept: FAMILY MEDICINE CLINIC | Age: 64
End: 2024-04-30
Payer: COMMERCIAL

## 2024-04-30 VITALS
DIASTOLIC BLOOD PRESSURE: 78 MMHG | RESPIRATION RATE: 18 BRPM | OXYGEN SATURATION: 100 % | BODY MASS INDEX: 29.98 KG/M2 | SYSTOLIC BLOOD PRESSURE: 139 MMHG | HEIGHT: 63 IN | WEIGHT: 169.2 LBS | TEMPERATURE: 97.7 F | HEART RATE: 77 BPM

## 2024-04-30 DIAGNOSIS — E55.9 VITAMIN D DEFICIENCY: ICD-10-CM

## 2024-04-30 DIAGNOSIS — K21.9 GASTROESOPHAGEAL REFLUX DISEASE WITHOUT ESOPHAGITIS: ICD-10-CM

## 2024-04-30 DIAGNOSIS — Z53.20 CERVICAL CANCER SCREENING DECLINED: ICD-10-CM

## 2024-04-30 DIAGNOSIS — R91.8 LUNG NODULES: ICD-10-CM

## 2024-04-30 DIAGNOSIS — M41.34 THORACOGENIC SCOLIOSIS OF THORACIC REGION: ICD-10-CM

## 2024-04-30 DIAGNOSIS — F32.9 REACTIVE DEPRESSION: ICD-10-CM

## 2024-04-30 DIAGNOSIS — I10 PRIMARY HYPERTENSION: Primary | ICD-10-CM

## 2024-04-30 DIAGNOSIS — Z28.21 HERPES ZOSTER VACCINATION DECLINED: ICD-10-CM

## 2024-04-30 DIAGNOSIS — G47.33 OSA (OBSTRUCTIVE SLEEP APNEA): ICD-10-CM

## 2024-04-30 PROBLEM — I16.0 HYPERTENSIVE URGENCY: Status: RESOLVED | Noted: 2023-10-25 | Resolved: 2024-04-30

## 2024-04-30 PROCEDURE — 3075F SYST BP GE 130 - 139MM HG: CPT | Performed by: STUDENT IN AN ORGANIZED HEALTH CARE EDUCATION/TRAINING PROGRAM

## 2024-04-30 PROCEDURE — 99214 OFFICE O/P EST MOD 30 MIN: CPT | Performed by: STUDENT IN AN ORGANIZED HEALTH CARE EDUCATION/TRAINING PROGRAM

## 2024-04-30 PROCEDURE — G2211 COMPLEX E/M VISIT ADD ON: HCPCS | Performed by: STUDENT IN AN ORGANIZED HEALTH CARE EDUCATION/TRAINING PROGRAM

## 2024-04-30 PROCEDURE — 3078F DIAST BP <80 MM HG: CPT | Performed by: STUDENT IN AN ORGANIZED HEALTH CARE EDUCATION/TRAINING PROGRAM

## 2024-04-30 RX ORDER — PANTOPRAZOLE SODIUM 40 MG/1
40 TABLET, DELAYED RELEASE ORAL
Qty: 90 TABLET | Refills: 2
Start: 2024-04-30

## 2024-04-30 ASSESSMENT — ENCOUNTER SYMPTOMS
ABDOMINAL PAIN: 0
SHORTNESS OF BREATH: 0
BACK PAIN: 1
COUGH: 0
WHEEZING: 0
ABDOMINAL DISTENTION: 0

## 2024-04-30 NOTE — PROGRESS NOTES
Delisa Diaz (:  1960) is a 63 y.o. female, established patient follow up , here for evaluation of the following:  Discuss Labs (Review from 24), Follow-up (Discuss CT from 24), and Sleep Problem (C/o working midnight shift, don't know if sleeping to much or not enough. )      Assessment & Plan   ASSESSMENT/PLAN  Patient notes that she is fatigued today, she thinks it is since she works nights, she does think she gets sufficient sleep during the day, she possibly has sleep apnea and does need sleep study, not currently on CPAP, labs reviewed and were within normal limits, she would like to switch from Prozac to Zoloft    1. Primary hypertension  Chronic, well controlled, continue current medications and treatment plan  2. Lung nodules  Did review CT scan of the chest, recommended 6-month follow-up, that would be around November her next visit, we can reorder then  3. Thoracogenic scoliosis of thoracic region  Chronic, minimal pain today, MRI did show minimal neuroforaminal or canal stenosis  4. DENISE (obstructive sleep apnea)  -     [facility]  5. Vitamin D deficiency  6. Gastroesophageal reflux disease without esophagitis  -     pantoprazole (PROTONIX) 40 MG tablet; Take 1 tablet by mouth every morning (before breakfast), Disp-90 tablet, R-2Adjust Sig  7. Reactive depression  -     sertraline (ZOLOFT) 50 MG tablet; Take 1 tablet by mouth daily, Disp-90 tablet, R-1Normal  8. Cervical cancer screening declined  9. Herpes zoster vaccination declined    Return in about 4 months (around 2024) for welcome to medicare .         Subjective   Portions of this note were completed by raphael Sotomayor during the course of the visit.  All decision-making was completed by, and the note in its entirety was reviewed by myself, Viviane Beckett MD.    SUBJECTIVE/OBJECTIVE:  HPI  Pt is here for follow up     Labs reviewed in office  using red yeast rice for cholesterol. Cholesterol at 245, LDL

## 2024-06-21 DIAGNOSIS — I10 HYPERTENSION, UNSPECIFIED TYPE: ICD-10-CM

## 2024-06-21 NOTE — TELEPHONE ENCOUNTER
amLODIPine (NORVASC) 5 MG tablet   lisinopril-hydroCHLOROthiazide (PRINZIDE;ZESTORETIC) 20-12.5 MG per tablet     Pt needs refill please send to [facility].

## 2024-06-22 RX ORDER — AMLODIPINE BESYLATE 5 MG/1
5 TABLET ORAL DAILY
Qty: 90 TABLET | Refills: 3 | Status: SHIPPED | OUTPATIENT
Start: 2024-06-22

## 2024-06-22 RX ORDER — LISINOPRIL AND HYDROCHLOROTHIAZIDE 20; 12.5 MG/1; MG/1
1 TABLET ORAL DAILY
Qty: 90 TABLET | Refills: 3 | Status: SHIPPED | OUTPATIENT
Start: 2024-06-22

## 2024-06-22 NOTE — TELEPHONE ENCOUNTER
1. Hypertension, unspecified type  -     amLODIPine (NORVASC) 5 MG tablet; Take 1 tablet by mouth daily, Disp-90 tablet, R-3Normal  -     lisinopril-hydroCHLOROthiazide (PRINZIDE;ZESTORETIC) 20-12.5 MG per tablet; Take 1 tablet by mouth daily, Disp-90 tablet, R-3Normal       Statement Selected

## 2024-07-19 ENCOUNTER — TELEPHONE (OUTPATIENT)
Dept: FAMILY MEDICINE CLINIC | Age: 64
End: 2024-07-19

## 2024-07-19 DIAGNOSIS — K21.9 GASTROESOPHAGEAL REFLUX DISEASE WITHOUT ESOPHAGITIS: ICD-10-CM

## 2024-07-19 NOTE — TELEPHONE ENCOUNTER
This patient called the pharmacy regarding this med. Patient said the pharmacy told her she can't get this med until August,  Patient said she needs them now.    pantoprazole (PROTONIX) 40 MG     Pharmacy  [id number] - [place], [place] - [address] -    [phone] -  [phone]

## 2024-07-19 NOTE — TELEPHONE ENCOUNTER
Please advise, pharmacy called, pt filled medication May 14th, should not be out of medication until 8/14.  Rx PCP sent in April did not escribe to pharmacy.  Pharmacist stated this current rx filled was from a rx refill from October.  Pharmacist stated if patient needs refill, she will hv to pay out of pocket.

## 2024-07-22 RX ORDER — PANTOPRAZOLE SODIUM 40 MG/1
40 TABLET, DELAYED RELEASE ORAL
Qty: 90 TABLET | Refills: 2 | Status: SHIPPED | OUTPATIENT
Start: 2024-07-22

## 2024-07-23 NOTE — TELEPHONE ENCOUNTER
1. Gastroesophageal reflux disease without esophagitis  -     pantoprazole (PROTONIX) 40 MG tablet; Take 1 tablet by mouth every morning (before breakfast), Disp-90 tablet, R-2Patient needs medication, now, last fill in JanuaryNormal

## 2024-09-18 ENCOUNTER — OFFICE VISIT (OUTPATIENT)
Dept: SLEEP CENTER | Age: 64
End: 2024-09-18
Payer: COMMERCIAL

## 2024-09-18 VITALS
OXYGEN SATURATION: 100 % | BODY MASS INDEX: 32.19 KG/M2 | RESPIRATION RATE: 14 BRPM | WEIGHT: 181.66 LBS | SYSTOLIC BLOOD PRESSURE: 161 MMHG | HEART RATE: 98 BPM | DIASTOLIC BLOOD PRESSURE: 93 MMHG | HEIGHT: 63 IN

## 2024-09-18 DIAGNOSIS — G47.20 CIRCADIAN RHYTHM DISORDER: Primary | ICD-10-CM

## 2024-09-18 DIAGNOSIS — G47.33 OSA (OBSTRUCTIVE SLEEP APNEA): ICD-10-CM

## 2024-09-18 PROCEDURE — 1123F ACP DISCUSS/DSCN MKR DOCD: CPT | Performed by: STUDENT IN AN ORGANIZED HEALTH CARE EDUCATION/TRAINING PROGRAM

## 2024-09-18 PROCEDURE — 99204 OFFICE O/P NEW MOD 45 MIN: CPT | Performed by: STUDENT IN AN ORGANIZED HEALTH CARE EDUCATION/TRAINING PROGRAM

## 2024-09-18 PROCEDURE — 3080F DIAST BP >= 90 MM HG: CPT | Performed by: STUDENT IN AN ORGANIZED HEALTH CARE EDUCATION/TRAINING PROGRAM

## 2024-09-18 PROCEDURE — 3077F SYST BP >= 140 MM HG: CPT | Performed by: STUDENT IN AN ORGANIZED HEALTH CARE EDUCATION/TRAINING PROGRAM

## 2024-09-18 ASSESSMENT — SLEEP AND FATIGUE QUESTIONNAIRES
ESS TOTAL SCORE: 23
HOW LIKELY ARE YOU TO NOD OFF OR FALL ASLEEP WHILE LYING DOWN TO REST IN THE AFTERNOON WHEN CIRCUMSTANCES PERMIT: HIGH CHANCE OF DOZING
HOW LIKELY ARE YOU TO NOD OFF OR FALL ASLEEP WHILE SITTING INACTIVE IN A PUBLIC PLACE: HIGH CHANCE OF DOZING
HOW LIKELY ARE YOU TO NOD OFF OR FALL ASLEEP WHEN YOU ARE A PASSENGER IN A CAR FOR AN HOUR WITHOUT A BREAK: HIGH CHANCE OF DOZING
HOW LIKELY ARE YOU TO NOD OFF OR FALL ASLEEP WHILE SITTING AND TALKING TO SOMEONE: MODERATE CHANCE OF DOZING
HOW LIKELY ARE YOU TO NOD OFF OR FALL ASLEEP WHILE WATCHING TV: HIGH CHANCE OF DOZING
HOW LIKELY ARE YOU TO NOD OFF OR FALL ASLEEP IN A CAR, WHILE STOPPED FOR A FEW MINUTES IN TRAFFIC: HIGH CHANCE OF DOZING
HOW LIKELY ARE YOU TO NOD OFF OR FALL ASLEEP WHILE SITTING AND READING: HIGH CHANCE OF DOZING
HOW LIKELY ARE YOU TO NOD OFF OR FALL ASLEEP WHILE SITTING QUIETLY AFTER LUNCH WITHOUT ALCOHOL: HIGH CHANCE OF DOZING

## 2024-11-04 ENCOUNTER — HOSPITAL ENCOUNTER (OUTPATIENT)
Dept: SLEEP CENTER | Age: 64
Discharge: HOME | End: 2024-11-04
Payer: COMMERCIAL

## 2024-11-04 DIAGNOSIS — G47.33 OSA (OBSTRUCTIVE SLEEP APNEA): ICD-10-CM

## 2024-11-04 PROCEDURE — 95800 SLP STDY UNATTENDED: CPT

## 2024-11-21 ENCOUNTER — TELEMEDICINE (OUTPATIENT)
Dept: SLEEP MEDICINE | Age: 64
End: 2024-11-21
Payer: COMMERCIAL

## 2024-11-21 DIAGNOSIS — G47.33 OSA (OBSTRUCTIVE SLEEP APNEA): Primary | ICD-10-CM

## 2024-11-21 DIAGNOSIS — E66.9 OBESITY (BMI 30-39.9): ICD-10-CM

## 2024-11-21 PROCEDURE — 1123F ACP DISCUSS/DSCN MKR DOCD: CPT | Performed by: STUDENT IN AN ORGANIZED HEALTH CARE EDUCATION/TRAINING PROGRAM

## 2024-11-21 PROCEDURE — 99214 OFFICE O/P EST MOD 30 MIN: CPT | Performed by: STUDENT IN AN ORGANIZED HEALTH CARE EDUCATION/TRAINING PROGRAM

## 2024-11-21 SDOH — HEALTH STABILITY: PHYSICAL HEALTH: ON AVERAGE, HOW MANY MINUTES DO YOU ENGAGE IN EXERCISE AT THIS LEVEL?: 30 MIN

## 2024-11-21 SDOH — HEALTH STABILITY: PHYSICAL HEALTH: ON AVERAGE, HOW MANY DAYS PER WEEK DO YOU ENGAGE IN MODERATE TO STRENUOUS EXERCISE (LIKE A BRISK WALK)?: 2 DAYS

## 2024-11-21 ASSESSMENT — LIFESTYLE VARIABLES
HOW OFTEN DO YOU HAVE A DRINK CONTAINING ALCOHOL: MONTHLY OR LESS
HOW MANY STANDARD DRINKS CONTAINING ALCOHOL DO YOU HAVE ON A TYPICAL DAY: PATIENT DOES NOT DRINK

## 2024-11-21 ASSESSMENT — PATIENT HEALTH QUESTIONNAIRE - PHQ9
SUM OF ALL RESPONSES TO PHQ QUESTIONS 1-9: 0
1. LITTLE INTEREST OR PLEASURE IN DOING THINGS: NOT AT ALL
SUM OF ALL RESPONSES TO PHQ QUESTIONS 1-9: 0
2. FEELING DOWN, DEPRESSED OR HOPELESS: NOT AT ALL
SUM OF ALL RESPONSES TO PHQ9 QUESTIONS 1 & 2: 0
SUM OF ALL RESPONSES TO PHQ QUESTIONS 1-9: 0
SUM OF ALL RESPONSES TO PHQ QUESTIONS 1-9: 0

## 2024-11-21 NOTE — PROGRESS NOTES
Delisa Diaz, was evaluated through a synchronous (real-time) audio-video encounter. The patient (or guardian if applicable) is aware that this is a billable service, which includes applicable co-pays. This Virtual Visit was conducted with patient's (and/or legal guardian's) consent. Patient identification was verified, and a caregiver was present when appropriate.   The patient was located at Home: [address] [place] [id number]  Provider was located at Facility (Appt Dept): [address] [place] [address]  Confirm you are appropriately licensed, registered, or certified to deliver care in the state where the patient is located as indicated above. If you are not or unsure, please re-schedule the visit: Yes, I confirm.     Delisa Diaz (:  1960) is a Established patient, presenting virtually for evaluation of the following:      Below is the assessment and plan developed based on review of pertinent history, physical exam, labs, studies, and medications.     Assessment & Plan  DENISE (obstructive sleep apnea)   Discussed sleep study results, emphasized severity of study and diagnosis including possible clinical sequelae of microvascular disease including higher incidence of stroke, atrial fibrillation, hypertension, or other cognitive microvascular damage. Counseled on appropriate use of the device, such as using distilled water, daily cleaning of mask and tube with gentle soap and water or non-toxic wipes. Counseled on troubleshooting device (such as rainout) and being aware of the ambient humidity of the room.           Obesity (BMI 30-39.9)   Rec'd 10-20% weight loss of total body weight (if feasible). Patient instructed on proper nutrition and estimated total daily caloric expenditure for their height and weight. Discussed that DENISE may improve with weight loss, but there is no guarantee of reversal.              No follow-ups on file.       Subjective   - Here for HST

## 2024-12-12 DIAGNOSIS — I10 HYPERTENSION, UNSPECIFIED TYPE: ICD-10-CM

## 2024-12-12 RX ORDER — AMLODIPINE BESYLATE 5 MG/1
5 TABLET ORAL DAILY
Qty: 90 TABLET | Refills: 3 | Status: SHIPPED | OUTPATIENT
Start: 2024-12-12

## 2025-01-08 DIAGNOSIS — E55.9 VITAMIN D DEFICIENCY: ICD-10-CM

## 2025-01-08 DIAGNOSIS — I10 PRIMARY HYPERTENSION: ICD-10-CM

## 2025-01-08 DIAGNOSIS — F32.9 REACTIVE DEPRESSION: Primary | ICD-10-CM

## 2025-01-08 DIAGNOSIS — E78.00 PURE HYPERCHOLESTEROLEMIA: ICD-10-CM

## 2025-01-08 NOTE — TELEPHONE ENCOUNTER
Great news! Your mammogram was negative. A negative mammogram means that the radiologist did not see any abnormalities in the breast tissue on the films. In general, you should continue with monthly self-examinations, regular examinations by your medical provider, and have another mammogram within a year or two, depending on the guidelines suggested by your age and your personal physician. However, if mammography was scheduled in order to evaluate a breast change noted during a physical examination, the radiologist or your medical provider may recommend further testing (such as breast ultrasound) to evaluate and diagnose that finding. Unfortunately, mammography is not a perfect test; it does not detect all breast cancers either palpable or nonpalpable. It is important not to ignore a new breast problem, such as a lump, even if you discover it shortly after a negative mammogram. Always notify your health care provider in case another mammogram, a different test (such as a breast ultrasound), or referral to a physician who specializes in breast problems is necessary. Name of Medication(s) Requested:    sertraline (ZOLOFT) 50 MG       Medication is on current medication list Yes    Dosage and directions were verified? Yes    Quantity verified: 90 day supply     Pharmacy Verified?  Yes  [id number] - [place], [place] -   [address] [facility] [phone] - F [phone]     Last Appointment:  4/30/2024    Future appts:  Future Appointments   Date Time Provider Department Center   1/13/2025 10:40 AM Viviane Beckett MD [facility]   2/20/2025  2:30 PM Angus Mg MD [facility]        (If no appt send self scheduling link. .REFILLAPPT)  Scheduling request sent?     [] Yes  [] No    Does patient need updated?  [] Yes  [] No

## 2025-01-10 ENCOUNTER — HOSPITAL ENCOUNTER (EMERGENCY)
Age: 65
Discharge: HOME | End: 2025-01-10
Payer: COMMERCIAL

## 2025-01-10 VITALS
TEMPERATURE: 98.4 F | BODY MASS INDEX: 32.76 KG/M2 | RESPIRATION RATE: 16 BRPM | HEIGHT: 62 IN | DIASTOLIC BLOOD PRESSURE: 76 MMHG | SYSTOLIC BLOOD PRESSURE: 166 MMHG | WEIGHT: 178 LBS | OXYGEN SATURATION: 99 % | HEART RATE: 96 BPM

## 2025-01-10 DIAGNOSIS — T14.8XXA BITE BY ANIMAL: Primary | ICD-10-CM

## 2025-01-10 PROCEDURE — 99211 OFF/OP EST MAY X REQ PHY/QHP: CPT

## 2025-01-10 RX ORDER — DOXYCYCLINE HYCLATE 100 MG
100 TABLET ORAL 2 TIMES DAILY
Qty: 14 TABLET | Refills: 0 | Status: SHIPPED | OUTPATIENT
Start: 2025-01-10 | End: 2025-01-10

## 2025-01-10 RX ORDER — DOXYCYCLINE HYCLATE 100 MG
100 TABLET ORAL 2 TIMES DAILY
Qty: 14 TABLET | Refills: 0 | Status: SHIPPED | OUTPATIENT
Start: 2025-01-10 | End: 2025-01-17

## 2025-01-10 RX ORDER — METRONIDAZOLE 500 MG/1
500 TABLET ORAL 3 TIMES DAILY
Qty: 21 TABLET | Refills: 0 | Status: SHIPPED | OUTPATIENT
Start: 2025-01-10 | End: 2025-01-10

## 2025-01-10 RX ORDER — METRONIDAZOLE 500 MG/1
500 TABLET ORAL 3 TIMES DAILY
Qty: 21 TABLET | Refills: 0 | Status: SHIPPED | OUTPATIENT
Start: 2025-01-10 | End: 2025-01-17

## 2025-01-10 ASSESSMENT — PAIN SCALES - GENERAL: PAINLEVEL_OUTOF10: 0

## 2025-01-10 ASSESSMENT — PAIN - FUNCTIONAL ASSESSMENT: PAIN_FUNCTIONAL_ASSESSMENT: 0-10

## 2025-01-11 NOTE — ED NOTES
Puncture wounds to lower legs cleansed JACQUIE and band aid applied.      Silvana Law LPN  01/10/25 2019

## 2025-01-11 NOTE — ED PROVIDER NOTES
[facility] URGENT CARE  EMERGENCY DEPARTMENT ENCOUNTER        NAME: Delisa Diaz  :  1960  MRN:  [id number]  Date of evaluation: 1/10/2025  Provider: Regan Cardona PA-C  PCP: Viviane Beckett MD  Note Started : 8:05 PM EST 1/10/25    Chief Complaint: Animal Bite (Known dog )      This is a 64-year-old female that presents to urgent care complaining of a dog bite to the right shin area that happened around 3:00 today.  Patient states she was at work and a residents family brought in a dog of theirs and the dog bit her on the leg.  She denies any bony pain no numbness or tingling.  She denies other injury.  Patient is up-to-date with tetanus.  On first contact patient she appears to be in no acute distress.  Patient states that the family showed her a paper that the dog is up-to-date with its immunizations.        Review of Systems  Pertinent positives and negatives are stated within HPI, all other systems reviewed and are negative.     Allergies: Penicillins and Amoxicillin-pot clavulanate     --------------------------------------------- PAST HISTORY ---------------------------------------------  Past Medical History:  has a past medical history of Back pain, Basal cell carcinoma of nose, Benign positional vertigo, Depression, Fibromyalgia, GERD (gastroesophageal reflux disease), and Hypertension.    Past Surgical History:  has a past surgical history that includes sinus surgery (2003); Skin cancer excision (2001); and Upper gastrointestinal endoscopy ().    Social History:  reports that she has never smoked. She has been exposed to tobacco smoke. She has never used smokeless tobacco. She reports that she does not drink alcohol and does not use drugs.    Family History: family history includes High Blood Pressure in her father and mother; Mental Illness in her father and mother; Osteoarthritis in her father.     The patient’s home medications have been reviewed.    The nursing notes  within the ED encounter have been reviewed.     ------------------------------------------------SCREENINGS----------------------------------------------                        CIWA Assessment  BP: (!) 166/76  Pulse: 96           ---------------------------------------------PHYSICAL EXAM --------------------------------------------    Vitals:    01/10/25 1957 01/10/25 1958   BP:  (!) 166/76   Pulse:  96   Resp:  16   Temp:  98.4 °F (36.9 °C)   SpO2:  99%   Weight: 80.7 kg (178 lb)    Height: 1.575 m (5' 2\")      Oxygen Saturation Interpretation: Normal     Physical Exam  Vitals and nursing note reviewed.   Constitutional:       General: She is not in acute distress.     Appearance: Normal appearance. She is well-developed.   HENT:      Head: Normocephalic and atraumatic.      Jaw: There is normal jaw occlusion.      Right Ear: Hearing and external ear normal.      Left Ear: Hearing and external ear normal.      Nose: Nose normal.      Mouth/Throat:      Mouth: Mucous membranes are moist.      Pharynx: Oropharynx is clear. Uvula midline.      Comments: Oropharynx is patent.     Eyes:      Pupils: Pupils are equal, round, and reactive to light.   Cardiovascular:      Rate and Rhythm: Normal rate and regular rhythm.      Heart sounds: Normal heart sounds.   Pulmonary:      Effort: Pulmonary effort is normal.      Breath sounds: Normal breath sounds.   Musculoskeletal:      Cervical back: Full passive range of motion without pain, normal range of motion and neck supple.      Comments: Right shin no bony pain.  No bony deformity.  Gait is steady.   Lymphadenopathy:      Cervical: No cervical adenopathy.   Skin:     General: Skin is warm and dry.      Findings: No rash.             Comments: Patient has 2 abrasions to the right shin.  Both are about 1.5 cm in diameter.  No active bleeding at this time.  There is no gaping wounds.  Bleeding is controlled.  There is no red streaking.  No calf or other leg swelling.

## 2025-01-13 ENCOUNTER — OFFICE VISIT (OUTPATIENT)
Dept: FAMILY MEDICINE CLINIC | Age: 65
End: 2025-01-13
Payer: MEDICARE

## 2025-01-13 VITALS
HEIGHT: 62 IN | SYSTOLIC BLOOD PRESSURE: 144 MMHG | DIASTOLIC BLOOD PRESSURE: 82 MMHG | OXYGEN SATURATION: 100 % | BODY MASS INDEX: 33.97 KG/M2 | WEIGHT: 184.6 LBS | TEMPERATURE: 97.6 F | RESPIRATION RATE: 17 BRPM | HEART RATE: 90 BPM

## 2025-01-13 DIAGNOSIS — T46.4X5A ACE-INHIBITOR COUGH: ICD-10-CM

## 2025-01-13 DIAGNOSIS — M25.532 LEFT WRIST PAIN: ICD-10-CM

## 2025-01-13 DIAGNOSIS — M25.552 LEFT HIP PAIN: ICD-10-CM
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
## 2025-01-13 DIAGNOSIS — Z13.820 SCREENING FOR OSTEOPOROSIS: ICD-10-CM

## 2025-01-13 DIAGNOSIS — M79.662 PAIN IN LEFT SHIN: ICD-10-CM

## 2025-01-13 DIAGNOSIS — Z00.00 WELCOME TO MEDICARE PREVENTIVE VISIT: Primary | ICD-10-CM

## 2025-01-13 DIAGNOSIS — Z23 INFLUENZA VACCINE NEEDED: ICD-10-CM
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
## 2025-01-13 DIAGNOSIS — R05.8 ACE-INHIBITOR COUGH: ICD-10-CM

## 2025-01-13 DIAGNOSIS — Z12.31 ENCOUNTER FOR SCREENING MAMMOGRAM FOR MALIGNANT NEOPLASM OF BREAST: ICD-10-CM

## 2025-01-13 DIAGNOSIS — Z78.0 POST-MENOPAUSAL: ICD-10-CM

## 2025-01-13 DIAGNOSIS — I10 PRIMARY HYPERTENSION: ICD-10-CM

## 2025-01-13 PROCEDURE — G8400 PT W/DXA NO RESULTS DOC: HCPCS | Performed by: STUDENT IN AN ORGANIZED HEALTH CARE EDUCATION/TRAINING PROGRAM

## 2025-01-13 PROCEDURE — G8417 CALC BMI ABV UP PARAM F/U: HCPCS | Performed by: STUDENT IN AN ORGANIZED HEALTH CARE EDUCATION/TRAINING PROGRAM

## 2025-01-13 PROCEDURE — G0008 ADMIN INFLUENZA VIRUS VAC: HCPCS | Performed by: STUDENT IN AN ORGANIZED HEALTH CARE EDUCATION/TRAINING PROGRAM

## 2025-01-13 PROCEDURE — G0402 INITIAL PREVENTIVE EXAM: HCPCS | Performed by: STUDENT IN AN ORGANIZED HEALTH CARE EDUCATION/TRAINING PROGRAM

## 2025-01-13 PROCEDURE — 1036F TOBACCO NON-USER: CPT | Performed by: STUDENT IN AN ORGANIZED HEALTH CARE EDUCATION/TRAINING PROGRAM

## 2025-01-13 PROCEDURE — 99214 OFFICE O/P EST MOD 30 MIN: CPT | Performed by: STUDENT IN AN ORGANIZED HEALTH CARE EDUCATION/TRAINING PROGRAM

## 2025-01-13 PROCEDURE — G8427 DOCREV CUR MEDS BY ELIG CLIN: HCPCS | Performed by: STUDENT IN AN ORGANIZED HEALTH CARE EDUCATION/TRAINING PROGRAM

## 2025-01-13 PROCEDURE — 90653 IIV ADJUVANT VACCINE IM: CPT | Performed by: STUDENT IN AN ORGANIZED HEALTH CARE EDUCATION/TRAINING PROGRAM

## 2025-01-13 PROCEDURE — 3079F DIAST BP 80-89 MM HG: CPT | Performed by: STUDENT IN AN ORGANIZED HEALTH CARE EDUCATION/TRAINING PROGRAM

## 2025-01-13 PROCEDURE — 3017F COLORECTAL CA SCREEN DOC REV: CPT | Performed by: STUDENT IN AN ORGANIZED HEALTH CARE EDUCATION/TRAINING PROGRAM

## 2025-01-13 PROCEDURE — 1090F PRES/ABSN URINE INCON ASSESS: CPT | Performed by: STUDENT IN AN ORGANIZED HEALTH CARE EDUCATION/TRAINING PROGRAM

## 2025-01-13 PROCEDURE — 3077F SYST BP >= 140 MM HG: CPT | Performed by: STUDENT IN AN ORGANIZED HEALTH CARE EDUCATION/TRAINING PROGRAM

## 2025-01-13 PROCEDURE — 1123F ACP DISCUSS/DSCN MKR DOCD: CPT | Performed by: STUDENT IN AN ORGANIZED HEALTH CARE EDUCATION/TRAINING PROGRAM

## 2025-01-13 RX ORDER — PREDNISONE 20 MG/1
40 TABLET ORAL DAILY
Qty: 10 TABLET | Refills: 0 | Status: SHIPPED | OUTPATIENT
Start: 2025-01-13 | End: 2025-01-18

## 2025-01-13 RX ORDER — LOSARTAN POTASSIUM AND HYDROCHLOROTHIAZIDE 12.5; 1 MG/1; MG/1
1 TABLET ORAL DAILY
Qty: 90 TABLET | Refills: 1 | Status: SHIPPED | OUTPATIENT
Start: 2025-01-13

## 2025-01-13 SDOH — ECONOMIC STABILITY: INCOME INSECURITY: IN THE LAST 12 MONTHS, WAS THERE A TIME WHEN YOU WERE NOT ABLE TO PAY THE MORTGAGE OR RENT ON TIME?: NO

## 2025-01-13 SDOH — ECONOMIC STABILITY: FOOD INSECURITY: WITHIN THE PAST 12 MONTHS, THE FOOD YOU BOUGHT JUST DIDN'T LAST AND YOU DIDN'T HAVE MONEY TO GET MORE.: NEVER TRUE

## 2025-01-13 SDOH — ECONOMIC STABILITY: TRANSPORTATION INSECURITY
IN THE PAST 12 MONTHS, HAS LACK OF TRANSPORTATION KEPT YOU FROM MEETINGS, WORK, OR FROM GETTING THINGS NEEDED FOR DAILY LIVING?: NO

## 2025-01-13 SDOH — ECONOMIC STABILITY: FOOD INSECURITY: WITHIN THE PAST 12 MONTHS, YOU WORRIED THAT YOUR FOOD WOULD RUN OUT BEFORE YOU GOT MONEY TO BUY MORE.: NEVER TRUE

## 2025-01-13 SDOH — HEALTH STABILITY: PHYSICAL HEALTH: ON AVERAGE, HOW MANY DAYS PER WEEK DO YOU ENGAGE IN MODERATE TO STRENUOUS EXERCISE (LIKE A BRISK WALK)?: 2 DAYS

## 2025-01-13 SDOH — HEALTH STABILITY: PHYSICAL HEALTH: ON AVERAGE, HOW MANY MINUTES DO YOU ENGAGE IN EXERCISE AT THIS LEVEL?: 30 MIN

## 2025-01-13 SDOH — ECONOMIC STABILITY: TRANSPORTATION INSECURITY
IN THE PAST 12 MONTHS, HAS THE LACK OF TRANSPORTATION KEPT YOU FROM MEDICAL APPOINTMENTS OR FROM GETTING MEDICATIONS?: NO

## 2025-01-13 ASSESSMENT — PATIENT HEALTH QUESTIONNAIRE - PHQ9
SUM OF ALL RESPONSES TO PHQ QUESTIONS 1-9: 0
SUM OF ALL RESPONSES TO PHQ9 QUESTIONS 1 & 2: 0
2. FEELING DOWN, DEPRESSED OR HOPELESS: NOT AT ALL
SUM OF ALL RESPONSES TO PHQ QUESTIONS 1-9: 0
1. LITTLE INTEREST OR PLEASURE IN DOING THINGS: NOT AT ALL
SUM OF ALL RESPONSES TO PHQ QUESTIONS 1-9: 0
SUM OF ALL RESPONSES TO PHQ QUESTIONS 1-9: 0

## 2025-01-13 ASSESSMENT — LIFESTYLE VARIABLES
HOW OFTEN DO YOU HAVE A DRINK CONTAINING ALCOHOL: NEVER
HOW OFTEN DO YOU HAVE SIX OR MORE DRINKS ON ONE OCCASION: 1
HOW MANY STANDARD DRINKS CONTAINING ALCOHOL DO YOU HAVE ON A TYPICAL DAY: PATIENT DOES NOT DRINK
HOW OFTEN DO YOU HAVE A DRINK CONTAINING ALCOHOL: 1
HOW MANY STANDARD DRINKS CONTAINING ALCOHOL DO YOU HAVE ON A TYPICAL DAY: 0

## 2025-01-13 NOTE — PATIENT INSTRUCTIONS
include:    Chest pain or pressure, or a strange feeling in the chest.     Sweating.     Shortness of breath.     Pain, pressure, or a strange feeling in the back, neck, jaw, or upper belly or in one or both shoulders or arms.     Lightheadedness or sudden weakness.     A fast or irregular heartbeat.   After you call 911, the  may tell you to chew 1 adult-strength or 2 to 4 low-dose aspirin. Wait for an ambulance. Do not try to drive yourself.  Watch closely for changes in your health, and be sure to contact your doctor if you have any problems.  Where can you learn more?  Go to https://www.[employer].net/patientEd and enter F075 to learn more about \"A Healthy Heart: Care Instructions.\"  Current as of: July 31, 2024  Content Version: 14.3  © 2024 [employer].   Care instructions adapted under license by [employer]. If you have questions about a medical condition or this instruction, always ask your healthcare professional. [employer], disclaims any warranty or liability for your use of this information.    Personalized Preventive Plan for Delisa Diaz - 1/13/2025  Medicare offers a range of preventive health benefits. Some of the tests and screenings are paid in full while other may be subject to a deductible, co-insurance, and/or copay.  Some of these benefits include a comprehensive review of your medical history including lifestyle, illnesses that may run in your family, and various assessments and screenings as appropriate.  After reviewing your medical record and screening and assessments performed today your provider may have ordered immunizations, labs, imaging, and/or referrals for you.  A list of these orders (if applicable) as well as your Preventive Care list are included within your After Visit Summary for your review.

## 2025-01-13 NOTE — PROGRESS NOTES
Delisa Diaz (:  1960) is a 64 y.o. female, Here for AWV, This is separate note for diagnosis management     Assessment & Plan   ASSESSMENT/PLAN  Patient was also seen today for annual well visit, please also see that note  Welcome to Medicare preventive visit  See below  Doing better now on the Zoloft  Labs due, she will get them done fasting  She will call insurance company to see what they would like her to go for hearing test  Fall, sequela  Fall off ladder, did not hit head or lose consciousness, did hit left wrist, elbow, shoulder, hip, still has pain in the left wrist, laterally, full range of motion, no swelling, will get x-ray, left hip pain over trochanteric bursa, could be bursitis versus hip pathology, will get x-ray plus prednisone, also pain over the shin, no bruising or bony deformity felt, will get x-ray, could consider physical therapy in the future and/or Ortho referral depending on results  -     XR HIP LEFT (2-3 VIEWS); Future  -     XR TIBIA FIBULA LEFT (2 VIEWS); Future  -     XR WRIST LEFT (MIN 3 VIEWS); Future  Left wrist pain  See above  -     XR WRIST LEFT (MIN 3 VIEWS); Future  -     predniSONE (DELTASONE) 20 MG tablet; Take 2 tablets by mouth daily for 5 days, Disp-10 tablet, R-0Normal  Left hip pain  See above  -     X  R HIP LEFT (2-3 VIEWS); Future  -     predniSONE (DELTASONE) 20 MG tablet; Take 2 tablets by mouth daily for 5 days, Disp-10 tablet, R-0Normal  Pain in left shin  See above  -     XR TIBIA FIBULA LEFT (2 VIEWS); Future  -     predniSONE (DELTASONE) 20 MG tablet; Take 2 tablets by mouth daily for 5 days, Disp-10 tablet, R-0Normal  Post-menopausal  -     DEXA BONE DENSITY AXIAL SKELETON; Future  Screening for osteoporosis  -     DEXA BONE DENSITY AXIAL SKELETON; Future  Encounter for screening mammogram for malignant neoplasm of breast  -     [facility] LEVI DIGITAL SCREEN BILATERAL; Future  Primary hypertension  Chronic, wants to get off the lisinopril she feels it is 
  Inactivity:  On average, how many days per week do you engage in moderate to strenuous exercise (like a brisk walk)?: 2 days (!) Abnormal  On average, how many minutes do you engage in exercise at this level?: 30 min  Interventions:  Recommendations: patient agrees to exercise for at least 150 minutes/week     Abnormal BMI (obese):  Body mass index is 33.76 kg/m². (!) Abnormal  Interventions:  low carbohydrate diet, exercise for at least 150 minutes/week    Hearing Screen:  Do you or your family notice any trouble with your hearing that hasn't been managed with hearing aids?: (!) Yes    Interventions:  Referred to Audiology       Advanced Directives:  Do you have a Living Will?: (!) No    Intervention:  has NO advanced directive - information provided         Objective   Vitals:    01/13/25 1059 01/13/25 1103   BP: (!) 159/87 (!) 144/82   Pulse: 90    Resp: 17    Temp: 97.6 °F (36.4 °C)    TempSrc: Temporal    SpO2: 100%    Weight: 83.7 kg (184 lb 9.6 oz)    Height: 1.575 m (5' 2.01\")       Body mass index is 33.76 kg/m².        Physical Exam  Constitutional:       General: She is not in acute distress.     Appearance: Normal appearance.   HENT:      Head: Normocephalic and atraumatic.      Right Ear: External ear normal.      Left Ear: External ear normal.      Nose: Nose normal.      Mouth/Throat:      Mouth: Mucous membranes are moist.   Eyes:      Extraocular Movements: Extraocular movements intact.      Conjunctiva/sclera: Conjunctivae normal.   Cardiovascular:      Rate and Rhythm: Normal rate and regular rhythm.      Heart sounds: No murmur heard.  Pulmonary:      Effort: Pulmonary effort is normal.      Breath sounds: Normal breath sounds. No wheezing.   Musculoskeletal:         General: Normal range of motion.      Right wrist: Normal.      Left wrist: Tenderness (Below thumb) and snuff box tenderness present. No swelling or deformity. Normal range of motion. Normal pulse.      Cervical back: Normal range

## 2025-01-21 DIAGNOSIS — E78.00 PURE HYPERCHOLESTEROLEMIA: ICD-10-CM

## 2025-01-21 DIAGNOSIS — E55.9 VITAMIN D DEFICIENCY: ICD-10-CM

## 2025-01-21 DIAGNOSIS — I10 PRIMARY HYPERTENSION: ICD-10-CM

## 2025-01-21 LAB
ALBUMIN: 4.2 G/DL (ref 3.5–5.2)
ALP BLD-CCNC: 98 U/L (ref 35–104)
ALT SERPL-CCNC: 26 U/L (ref 0–32)
ANION GAP SERPL CALCULATED.3IONS-SCNC: 19 MMOL/L (ref 7–16)
AST SERPL-CCNC: 28 U/L (ref 0–31)
BASOPHILS ABSOLUTE: 0.07 K/UL (ref 0–0.2)
BASOPHILS RELATIVE PERCENT: 1 % (ref 0–2)
BILIRUB SERPL-MCNC: 0.5 MG/DL (ref 0–1.2)
BUN BLDV-MCNC: 14 MG/DL (ref 6–23)
CALCIUM SERPL-MCNC: 9.4 MG/DL (ref 8.6–10.2)
CHLORIDE BLD-SCNC: 100 MMOL/L (ref 98–107)
CHOLESTEROL, TOTAL: 238 MG/DL
CO2: 24 MMOL/L (ref 22–29)
CREAT SERPL-MCNC: 0.7 MG/DL (ref 0.5–1)
EOSINOPHILS ABSOLUTE: 0.22 K/UL (ref 0.05–0.5)
EOSINOPHILS RELATIVE PERCENT: 3 % (ref 0–6)
GFR, ESTIMATED: >90 ML/MIN/1.73M2
GLUCOSE BLD-MCNC: 100 MG/DL (ref 74–99)
HCT VFR BLD CALC: 44.4 % (ref 34–48)
HDLC SERPL-MCNC: 61 MG/DL
HEMOGLOBIN: 14.5 G/DL (ref 11.5–15.5)
IMMATURE GRANULOCYTES %: 1 % (ref 0–5)
IMMATURE GRANULOCYTES ABSOLUTE: 0.04 K/UL (ref 0–0.58)
LDL CHOLESTEROL: 135 MG/DL
LYMPHOCYTES ABSOLUTE: 3.26 K/UL (ref 1.5–4)
LYMPHOCYTES RELATIVE PERCENT: 38 % (ref 20–42)
MCH RBC QN AUTO: 28.5 PG (ref 26–35)
MCHC RBC AUTO-ENTMCNC: 32.7 G/DL (ref 32–34.5)
MCV RBC AUTO: 87.2 FL (ref 80–99.9)
MONOCYTES ABSOLUTE: 0.74 K/UL (ref 0.1–0.95)
MONOCYTES RELATIVE PERCENT: 9 % (ref 2–12)
NEUTROPHILS ABSOLUTE: 4.25 K/UL (ref 1.8–7.3)
NEUTROPHILS RELATIVE PERCENT: 50 % (ref 43–80)
PDW BLD-RTO: 13.4 % (ref 11.5–15)
PLATELET # BLD: 263 K/UL (ref 130–450)
PMV BLD AUTO: 11.5 FL (ref 7–12)
POTASSIUM SERPL-SCNC: 4.2 MMOL/L (ref 3.5–5)
RBC # BLD: 5.09 M/UL (ref 3.5–5.5)
SODIUM BLD-SCNC: 143 MMOL/L (ref 132–146)
TOTAL PROTEIN: 7.3 G/DL (ref 6.4–8.3)
TRIGL SERPL-MCNC: 208 MG/DL
TSH SERPL DL<=0.05 MIU/L-ACNC: 1.51 UIU/ML (ref 0.27–4.2)
VITAMIN D 25-HYDROXY: 31 NG/ML (ref 30–100)
VLDLC SERPL CALC-MCNC: 42 MG/DL
WBC # BLD: 8.6 K/UL (ref 4.5–11.5)

## 2025-02-19 ENCOUNTER — HOSPITAL ENCOUNTER (OUTPATIENT)
Dept: GENERAL RADIOLOGY | Age: 65
Discharge: HOME | End: 2025-02-21
Payer: MEDICARE

## 2025-02-19 VITALS — BODY MASS INDEX: 32.57 KG/M2 | HEIGHT: 62 IN | WEIGHT: 177 LBS

## 2025-02-19 DIAGNOSIS — Z13.820 SCREENING FOR OSTEOPOROSIS: ICD-10-CM

## 2025-02-19 DIAGNOSIS — Z78.0 POST-MENOPAUSAL: ICD-10-CM

## 2025-02-19 DIAGNOSIS — Z12.31 ENCOUNTER FOR SCREENING MAMMOGRAM FOR MALIGNANT NEOPLASM OF BREAST: ICD-10-CM

## 2025-02-19 PROCEDURE — 77080 DXA BONE DENSITY AXIAL: CPT

## 2025-02-19 PROCEDURE — 77063 BREAST TOMOSYNTHESIS BI: CPT

## 2025-02-20 ENCOUNTER — TELEPHONE (OUTPATIENT)
Dept: SLEEP MEDICINE | Age: 65
End: 2025-02-20

## 2025-02-20 ENCOUNTER — TELEMEDICINE (OUTPATIENT)
Dept: SLEEP MEDICINE | Age: 65
End: 2025-02-20
Payer: MEDICARE

## 2025-02-20 DIAGNOSIS — G47.33 OSA (OBSTRUCTIVE SLEEP APNEA): Primary | ICD-10-CM

## 2025-02-20 PROCEDURE — 1090F PRES/ABSN URINE INCON ASSESS: CPT | Performed by: STUDENT IN AN ORGANIZED HEALTH CARE EDUCATION/TRAINING PROGRAM

## 2025-02-20 PROCEDURE — 3017F COLORECTAL CA SCREEN DOC REV: CPT | Performed by: STUDENT IN AN ORGANIZED HEALTH CARE EDUCATION/TRAINING PROGRAM

## 2025-02-20 PROCEDURE — G8400 PT W/DXA NO RESULTS DOC: HCPCS | Performed by: STUDENT IN AN ORGANIZED HEALTH CARE EDUCATION/TRAINING PROGRAM

## 2025-02-20 PROCEDURE — 1036F TOBACCO NON-USER: CPT | Performed by: STUDENT IN AN ORGANIZED HEALTH CARE EDUCATION/TRAINING PROGRAM

## 2025-02-20 PROCEDURE — G8417 CALC BMI ABV UP PARAM F/U: HCPCS | Performed by: STUDENT IN AN ORGANIZED HEALTH CARE EDUCATION/TRAINING PROGRAM

## 2025-02-20 PROCEDURE — G8428 CUR MEDS NOT DOCUMENT: HCPCS | Performed by: STUDENT IN AN ORGANIZED HEALTH CARE EDUCATION/TRAINING PROGRAM

## 2025-02-20 PROCEDURE — 99214 OFFICE O/P EST MOD 30 MIN: CPT | Performed by: STUDENT IN AN ORGANIZED HEALTH CARE EDUCATION/TRAINING PROGRAM

## 2025-02-20 PROCEDURE — 1123F ACP DISCUSS/DSCN MKR DOCD: CPT | Performed by: STUDENT IN AN ORGANIZED HEALTH CARE EDUCATION/TRAINING PROGRAM

## 2025-02-20 NOTE — PROGRESS NOTES
Delisa Diaz, was evaluated through a synchronous (real-time) audio-video encounter. The patient (or guardian if applicable) is aware that this is a billable service, which includes applicable co-pays. This Virtual Visit was conducted with patient's (and/or legal guardian's) consent. Patient identification was verified, and a caregiver was present when appropriate.   The patient was located at Home: [address] [place] [id number]  Provider was located at Facility (Appt Dept): [address] [place] [address]  Confirm you are appropriately licensed, registered, or certified to deliver care in the state where the patient is located as indicated above. If you are not or unsure, please re-schedule the visit: Yes, I confirm.     Delisa Diaz (:  1960) is a Established patient, presenting virtually for evaluation of the following:      Below is the assessment and plan developed based on review of pertinent history, physical exam, labs, studies, and medications.     Assessment & Plan  DENISE (obstructive sleep apnea)   Chronic, not at goal (unstable), Will order BIPAP titration and submit for new equipment due to pressure intolerance.   Will order equipment thereafter. Titration required for restart due to low overall compliance.    Orders:    Sleep Study with PAP Titration; Future      No follow-ups on file.       Subjective     - Had cough due to lisinopril  - Could not use device well for a month  - Derives small amounts of benefit from therapy  - Events generally uncontrolled  - Requiring >15 cmH2O for most of the night  - Does have pressure intolerance    Objective   Patient-Reported Vitals  No data recorded     Physical Exam  [INSTRUCTIONS:  \"[x]\" Indicates a positive item  \"[]\" Indicates a negative item  -- DELETE ALL ITEMS NOT EXAMINED]    Constitutional: [x] Appears well-developed and well-nourished [x] No apparent distress      [] Abnormal -     Mental status: [x] Alert and

## 2025-03-01 ENCOUNTER — PATIENT MESSAGE (OUTPATIENT)
Dept: FAMILY MEDICINE CLINIC | Age: 65
End: 2025-03-01

## 2025-03-01 DIAGNOSIS — K21.9 GASTROESOPHAGEAL REFLUX DISEASE WITHOUT ESOPHAGITIS: Primary | ICD-10-CM

## 2025-03-06 RX ORDER — PANTOPRAZOLE SODIUM 20 MG/1
20 TABLET, DELAYED RELEASE ORAL
Qty: 90 TABLET | Refills: 1 | Status: SHIPPED | OUTPATIENT
Start: 2025-03-06

## 2025-03-07 NOTE — TELEPHONE ENCOUNTER
See MyChart message, patient to titrate off the parenteral Prosol 40 onto 20    1. Gastroesophageal reflux disease without esophagitis  -     pantoprazole (PROTONIX) 20 MG tablet; Take 1 tablet by mouth every morning (before breakfast), Disp-90 tablet, R-1For titration off of the 40s onto the 20s and then from 20s to as needed, titration schedule reviewed with the patient, please fill these 20sNormal

## 2025-03-19 ENCOUNTER — HOSPITAL ENCOUNTER (OUTPATIENT)
Dept: SLEEP CENTER | Age: 65
Discharge: HOME | End: 2025-03-19
Payer: MEDICARE

## 2025-03-19 DIAGNOSIS — G47.33 OSA (OBSTRUCTIVE SLEEP APNEA): ICD-10-CM

## 2025-03-19 PROCEDURE — 95811 POLYSOM 6/>YRS CPAP 4/> PARM: CPT

## 2025-06-19 ENCOUNTER — TELEMEDICINE (OUTPATIENT)
Dept: SLEEP MEDICINE | Age: 65
End: 2025-06-19

## 2025-06-19 DIAGNOSIS — G47.33 OSA (OBSTRUCTIVE SLEEP APNEA): Primary | ICD-10-CM

## 2025-06-19 ASSESSMENT — SLEEP AND FATIGUE QUESTIONNAIRES
HOW LIKELY ARE YOU TO NOD OFF OR FALL ASLEEP WHILE SITTING AND READING: MODERATE CHANCE OF DOZING
HOW LIKELY ARE YOU TO NOD OFF OR FALL ASLEEP IN A CAR, WHILE STOPPED FOR A FEW MINUTES IN TRAFFIC: WOULD NEVER DOZE
HOW LIKELY ARE YOU TO NOD OFF OR FALL ASLEEP WHEN YOU ARE A PASSENGER IN A CAR FOR AN HOUR WITHOUT A BREAK: SLIGHT CHANCE OF DOZING
HOW LIKELY ARE YOU TO NOD OFF OR FALL ASLEEP WHILE LYING DOWN TO REST IN THE AFTERNOON WHEN CIRCUMSTANCES PERMIT: MODERATE CHANCE OF DOZING
HOW LIKELY ARE YOU TO NOD OFF OR FALL ASLEEP WHILE SITTING INACTIVE IN A PUBLIC PLACE: SLIGHT CHANCE OF DOZING
ESS TOTAL SCORE: 10
HOW LIKELY ARE YOU TO NOD OFF OR FALL ASLEEP WHILE SITTING QUIETLY AFTER LUNCH WITHOUT ALCOHOL: SLIGHT CHANCE OF DOZING
HOW LIKELY ARE YOU TO NOD OFF OR FALL ASLEEP WHILE WATCHING TV: MODERATE CHANCE OF DOZING
HOW LIKELY ARE YOU TO NOD OFF OR FALL ASLEEP WHILE LYING DOWN TO REST IN THE AFTERNOON WHEN CIRCUMSTANCES PERMIT: MODERATE CHANCE OF DOZING
HOW LIKELY ARE YOU TO NOD OFF OR FALL ASLEEP WHILE SITTING AND TALKING TO SOMEONE: SLIGHT CHANCE OF DOZING
HOW LIKELY ARE YOU TO NOD OFF OR FALL ASLEEP WHILE SITTING INACTIVE IN A PUBLIC PLACE: SLIGHT CHANCE OF DOZING
HOW LIKELY ARE YOU TO NOD OFF OR FALL ASLEEP WHILE SITTING AND TALKING TO SOMEONE: SLIGHT CHANCE OF DOZING
HOW LIKELY ARE YOU TO NOD OFF OR FALL ASLEEP WHILE SITTING AND READING: MODERATE CHANCE OF DOZING
HOW LIKELY ARE YOU TO NOD OFF OR FALL ASLEEP WHILE SITTING QUIETLY AFTER LUNCH WITHOUT ALCOHOL: SLIGHT CHANCE OF DOZING
HOW LIKELY ARE YOU TO NOD OFF OR FALL ASLEEP WHILE WATCHING TV: MODERATE CHANCE OF DOZING
HOW LIKELY ARE YOU TO NOD OFF OR FALL ASLEEP IN A CAR, WHILE STOPPED FOR A FEW MINUTES IN TRAFFIC: WOULD NEVER DOZE
HOW LIKELY ARE YOU TO NOD OFF OR FALL ASLEEP WHEN YOU ARE A PASSENGER IN A CAR FOR AN HOUR WITHOUT A BREAK: SLIGHT CHANCE OF DOZING

## 2025-06-19 NOTE — PROGRESS NOTES
Delisa Diaz, was evaluated through a synchronous (real-time) audio-video encounter. The patient (or guardian if applicable) is aware that this is a billable service, which includes applicable co-pays. This Virtual Visit was conducted with patient's (and/or legal guardian's) consent. Patient identification was verified, and a caregiver was present when appropriate.   The patient was located at Home: [address] [place] [id number]  Provider was located at Facility (Appt Dept): [address] [place] [address]  Confirm you are appropriately licensed, registered, or certified to deliver care in the state where the patient is located as indicated above. If you are not or unsure, please re-schedule the visit: Yes, I confirm.     Delisa Diaz (:  1960) is a Established patient, presenting virtually for evaluation of the following:      Below is the assessment and plan developed based on review of pertinent history, physical exam, labs, studies, and medications.     Assessment & Plan  DENISE (obstructive sleep apnea)   Chronic, at goal (stable), continue current treatment plan  - Pressure settings reduced for comfort  - F-U 1 year.    Orders:    DME Order for CPAP as OP      No follow-ups on file.       Subjective          - Doing well with CPAP  - Derives benefit from therapy  - Less napping overall  - Still tired from general shift work  - Having some pressure intolerance at the higher end    Objective   Patient-Reported Vitals  No data recorded     Physical Exam  [INSTRUCTIONS:  \"[x]\" Indicates a positive item  \"[]\" Indicates a negative item  -- DELETE ALL ITEMS NOT EXAMINED]    Constitutional: [x] Appears well-developed and well-nourished [x] No apparent distress      [] Abnormal -     Mental status: [x] Alert and awake  [x] Oriented to person/place/time [x] Able to follow commands    [] Abnormal -     Eyes:   EOM    [x]  Normal    [] Abnormal -   Sclera  [x]  Normal    [] Abnormal

## 2025-07-14 ENCOUNTER — OFFICE VISIT (OUTPATIENT)
Dept: FAMILY MEDICINE CLINIC | Age: 65
End: 2025-07-14
Payer: MEDICARE

## 2025-07-14 VITALS
HEIGHT: 62 IN | SYSTOLIC BLOOD PRESSURE: 154 MMHG | BODY MASS INDEX: 34.23 KG/M2 | TEMPERATURE: 98.3 F | RESPIRATION RATE: 17 BRPM | WEIGHT: 186 LBS | HEART RATE: 93 BPM | OXYGEN SATURATION: 99 % | DIASTOLIC BLOOD PRESSURE: 84 MMHG

## 2025-07-14 DIAGNOSIS — I87.2 VENOUS INSUFFICIENCY: ICD-10-CM

## 2025-07-14 DIAGNOSIS — M79.89 LEG SWELLING: Primary | ICD-10-CM

## 2025-07-14 DIAGNOSIS — I87.2 STASIS DERMATITIS: ICD-10-CM

## 2025-07-14 DIAGNOSIS — I10 PRIMARY HYPERTENSION: ICD-10-CM

## 2025-07-14 DIAGNOSIS — Z23 NEED FOR VACCINATION AGAINST STREPTOCOCCUS PNEUMONIAE: ICD-10-CM

## 2025-07-14 PROBLEM — G31.9 BRAIN ATROPHY: Status: RESOLVED | Noted: 2023-10-31 | Resolved: 2025-07-14

## 2025-07-14 PROBLEM — E55.9 VITAMIN D DEFICIENCY: Status: RESOLVED | Noted: 2023-10-31 | Resolved: 2025-07-14

## 2025-07-14 PROCEDURE — 1090F PRES/ABSN URINE INCON ASSESS: CPT | Performed by: STUDENT IN AN ORGANIZED HEALTH CARE EDUCATION/TRAINING PROGRAM

## 2025-07-14 PROCEDURE — 99214 OFFICE O/P EST MOD 30 MIN: CPT | Performed by: STUDENT IN AN ORGANIZED HEALTH CARE EDUCATION/TRAINING PROGRAM

## 2025-07-14 PROCEDURE — G2211 COMPLEX E/M VISIT ADD ON: HCPCS | Performed by: STUDENT IN AN ORGANIZED HEALTH CARE EDUCATION/TRAINING PROGRAM

## 2025-07-14 PROCEDURE — G8427 DOCREV CUR MEDS BY ELIG CLIN: HCPCS | Performed by: STUDENT IN AN ORGANIZED HEALTH CARE EDUCATION/TRAINING PROGRAM

## 2025-07-14 PROCEDURE — 1123F ACP DISCUSS/DSCN MKR DOCD: CPT | Performed by: STUDENT IN AN ORGANIZED HEALTH CARE EDUCATION/TRAINING PROGRAM

## 2025-07-14 PROCEDURE — G8399 PT W/DXA RESULTS DOCUMENT: HCPCS | Performed by: STUDENT IN AN ORGANIZED HEALTH CARE EDUCATION/TRAINING PROGRAM

## 2025-07-14 PROCEDURE — 1036F TOBACCO NON-USER: CPT | Performed by: STUDENT IN AN ORGANIZED HEALTH CARE EDUCATION/TRAINING PROGRAM

## 2025-07-14 PROCEDURE — 1159F MED LIST DOCD IN RCRD: CPT | Performed by: STUDENT IN AN ORGANIZED HEALTH CARE EDUCATION/TRAINING PROGRAM

## 2025-07-14 PROCEDURE — 90677 PCV20 VACCINE IM: CPT | Performed by: STUDENT IN AN ORGANIZED HEALTH CARE EDUCATION/TRAINING PROGRAM

## 2025-07-14 PROCEDURE — 1160F RVW MEDS BY RX/DR IN RCRD: CPT | Performed by: STUDENT IN AN ORGANIZED HEALTH CARE EDUCATION/TRAINING PROGRAM

## 2025-07-14 PROCEDURE — 3017F COLORECTAL CA SCREEN DOC REV: CPT | Performed by: STUDENT IN AN ORGANIZED HEALTH CARE EDUCATION/TRAINING PROGRAM

## 2025-07-14 PROCEDURE — G0009 ADMIN PNEUMOCOCCAL VACCINE: HCPCS | Performed by: STUDENT IN AN ORGANIZED HEALTH CARE EDUCATION/TRAINING PROGRAM

## 2025-07-14 PROCEDURE — G8417 CALC BMI ABV UP PARAM F/U: HCPCS | Performed by: STUDENT IN AN ORGANIZED HEALTH CARE EDUCATION/TRAINING PROGRAM

## 2025-07-14 PROCEDURE — 3079F DIAST BP 80-89 MM HG: CPT | Performed by: STUDENT IN AN ORGANIZED HEALTH CARE EDUCATION/TRAINING PROGRAM

## 2025-07-14 PROCEDURE — 3077F SYST BP >= 140 MM HG: CPT | Performed by: STUDENT IN AN ORGANIZED HEALTH CARE EDUCATION/TRAINING PROGRAM

## 2025-07-14 RX ORDER — LANOLIN ALCOHOL/MO/W.PET/CERES
250 CREAM (GRAM) TOPICAL NIGHTLY
COMMUNITY

## 2025-07-14 RX ORDER — BERBERINE CHLOR/SEAWEED/CHROM 500-250 MG
CAPSULE ORAL
COMMUNITY

## 2025-07-14 RX ORDER — LOSARTAN POTASSIUM AND HYDROCHLOROTHIAZIDE 12.5; 1 MG/1; MG/1
1 TABLET ORAL DAILY
Qty: 90 TABLET | Refills: 1 | Status: SHIPPED | OUTPATIENT
Start: 2025-07-14

## 2025-07-14 ASSESSMENT — ENCOUNTER SYMPTOMS
COUGH: 0
ABDOMINAL PAIN: 0
WHEEZING: 0
SHORTNESS OF BREATH: 0
ABDOMINAL DISTENTION: 0

## 2025-07-14 NOTE — PROGRESS NOTES
Delisa Diaz (:  1960) is a 65 y.o. female, established patient follow up , here for evaluation of the following:  Hypertension, Hemorrhoids, and Depression (Wants to if she can go down 1/2)     The patient (or guardian, if applicable) and other individuals in attendance with the patient were advised that Artificial Intelligence will be utilized during this visit to record, process the conversation to generate a clinical note, and support improvement of the AI technology. The patient (or guardian, if applicable) and other individuals in attendance at the appointment consented to the use of AI, including the recording.      Assessment & Plan  1. Hypertension: Elevated.  - Blood pressure reading today is 154/84, elevated from usual home readings of 130s-140s. Discussed dietary recommendations, medication review, and the impact of sleep apnea on blood pressure.  - Continue current medications (amlodipine 5 mg and losartan hydrochlorothiazide), use CPAP machine, wear compression socks, and monitor blood pressure. Consider medication adjustment if BP remains >130/80.    2. Sleep apnea: Inconsistent CPAP use.  - CPAP machine use is recommended to manage sleep apnea and potentially lower blood pressure. Discussed the physiological impact of untreated sleep apnea on blood pressure.  - Advised to consistently use CPAP machine.    3. Leg swelling: Chronic.  - Swelling noted in legs, exacerbated by prolonged standing and work. Physical exam findings suggest venous insufficiency and stasis dermatitis.  - Advised to wear graded compression socks (20-30 mmHg) and consider higher grade ones; monitor swelling and consider further investigation if it persists.  - Discussed increasing hydrochlorothiazide component of blood pressure medication and/or as needed furosemide however patient notes this increases urination too much and declines    4. Hemorrhoids: Occasional flare-ups.  - Occasional flare-ups reported, with